# Patient Record
Sex: FEMALE | Race: BLACK OR AFRICAN AMERICAN | NOT HISPANIC OR LATINO
[De-identification: names, ages, dates, MRNs, and addresses within clinical notes are randomized per-mention and may not be internally consistent; named-entity substitution may affect disease eponyms.]

---

## 2023-07-17 PROBLEM — Z00.00 ENCOUNTER FOR PREVENTIVE HEALTH EXAMINATION: Status: ACTIVE | Noted: 2023-07-17

## 2023-08-02 ENCOUNTER — APPOINTMENT (OUTPATIENT)
Dept: OBGYN | Facility: CLINIC | Age: 28
End: 2023-08-02
Payer: MEDICAID

## 2023-08-02 ENCOUNTER — ASOB RESULT (OUTPATIENT)
Age: 28
End: 2023-08-02

## 2023-08-02 VITALS
HEART RATE: 96 BPM | BODY MASS INDEX: 36.48 KG/M2 | HEIGHT: 66 IN | WEIGHT: 227 LBS | DIASTOLIC BLOOD PRESSURE: 78 MMHG | SYSTOLIC BLOOD PRESSURE: 117 MMHG

## 2023-08-02 DIAGNOSIS — Z34.91 ENCOUNTER FOR SUPERVISION OF NORMAL PREGNANCY, UNSPECIFIED, FIRST TRIMESTER: ICD-10-CM

## 2023-08-02 PROCEDURE — 99203 OFFICE O/P NEW LOW 30 MIN: CPT

## 2023-08-02 PROCEDURE — 76817 TRANSVAGINAL US OBSTETRIC: CPT

## 2023-08-02 NOTE — PLAN
[FreeTextEntry1] : 27 year yo  @ 8 weeks 4 d by LMP c/w early ultrasound.   Routine new OB labs and counseling. Welcome letter given.   Discussed screening vs. diagnostic/invasive testing for chromosomal abnormalities. Declines amnio/[facility]. Will schedule NT, will decide re: NIPS by next visit.   RTO 4 wks.  Aldo Leong

## 2023-08-02 NOTE — HISTORY OF PRESENT ILLNESS
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 Reports significant nausea/vomiting, though still able to keep food down. +breast tenderness. No abdominal cramping. No vaginal bleeding. +constipation. LMP 6/3/23

## 2023-08-03 LAB
ABO + RH PNL BLD: NORMAL
ALBUMIN SERPL ELPH-MCNC: 4.4 G/DL
ALP BLD-CCNC: 73 U/L
ALT SERPL-CCNC: 9 U/L
ANION GAP SERPL CALC-SCNC: 20 MMOL/L
AST SERPL-CCNC: 13 U/L
B19V IGG SER QL IA: 4.54 INDEX
B19V IGG+IGM SER-IMP: NORMAL
B19V IGG+IGM SER-IMP: POSITIVE
B19V IGM FLD-ACNC: 0.17 INDEX
B19V IGM SER-ACNC: NEGATIVE
BILIRUB SERPL-MCNC: 0.2 MG/DL
BLD GP AB SCN SERPL QL: NORMAL
BUN SERPL-MCNC: 9 MG/DL
C TRACH RRNA SPEC QL NAA+PROBE: NOT DETECTED
CALCIUM SERPL-MCNC: 10 MG/DL
CHLORIDE SERPL-SCNC: 101 MMOL/L
CMV IGG SERPL QL: >10 U/ML
CMV IGG SERPL-IMP: POSITIVE
CMV IGM SERPL QL: <8 AU/ML
CMV IGM SERPL QL: NEGATIVE
CO2 SERPL-SCNC: 19 MMOL/L
CREAT SERPL-MCNC: 0.85 MG/DL
EGFR: 96 ML/MIN/1.73M2
ESTIMATED AVERAGE GLUCOSE: 97 MG/DL
GLUCOSE SERPL-MCNC: 47 MG/DL
HBA1C MFR BLD HPLC: 5 %
HBV SURFACE AG SER QL: NONREACTIVE
HCV AB SER QL: NONREACTIVE
HCV S/CO RATIO: 0.11 S/CO
HIV1+2 AB SPEC QL IA.RAPID: NONREACTIVE
MEV IGG FLD QL IA: 150 AU/ML
MEV IGG+IGM SER-IMP: POSITIVE
N GONORRHOEA RRNA SPEC QL NAA+PROBE: NOT DETECTED
POTASSIUM SERPL-SCNC: 4.3 MMOL/L
PROT SERPL-MCNC: 7.5 G/DL
RUBV IGG FLD-ACNC: 1.4 INDEX
RUBV IGG SER-IMP: POSITIVE
SODIUM SERPL-SCNC: 140 MMOL/L
SOURCE AMPLIFICATION: NORMAL
T GONDII AB SER-IMP: NEGATIVE
T GONDII AB SER-IMP: NEGATIVE
T GONDII IGG SER QL: <3 IU/ML
T GONDII IGM SER QL: <3 AU/ML
T PALLIDUM AB SER QL IA: NEGATIVE
TSH SERPL-ACNC: 1.15 UIU/ML
VZV AB TITR SER: POSITIVE
VZV IGG SER IF-ACNC: 563.7 INDEX

## 2023-08-04 LAB
BACTERIA UR CULT: NORMAL
LEAD BLD-MCNC: 1.1 UG/DL

## 2023-08-13 ENCOUNTER — TRANSCRIPTION ENCOUNTER (OUTPATIENT)
Age: 28
End: 2023-08-13

## 2023-09-05 ENCOUNTER — NON-APPOINTMENT (OUTPATIENT)
Age: 28
End: 2023-09-05

## 2023-09-06 ENCOUNTER — NON-APPOINTMENT (OUTPATIENT)
Age: 28
End: 2023-09-06

## 2023-09-06 ENCOUNTER — ASOB RESULT (OUTPATIENT)
Age: 28
End: 2023-09-06

## 2023-09-06 ENCOUNTER — APPOINTMENT (OUTPATIENT)
Dept: ANTEPARTUM | Facility: CLINIC | Age: 28
End: 2023-09-06
Payer: MEDICAID

## 2023-09-06 ENCOUNTER — APPOINTMENT (OUTPATIENT)
Dept: OBGYN | Facility: CLINIC | Age: 28
End: 2023-09-06
Payer: MEDICAID

## 2023-09-06 VITALS
HEIGHT: 66 IN | WEIGHT: 233 LBS | BODY MASS INDEX: 37.45 KG/M2 | SYSTOLIC BLOOD PRESSURE: 114 MMHG | DIASTOLIC BLOOD PRESSURE: 75 MMHG | HEART RATE: 93 BPM

## 2023-09-06 PROCEDURE — 76813 OB US NUCHAL MEAS 1 GEST: CPT

## 2023-09-06 PROCEDURE — 76801 OB US < 14 WKS SINGLE FETUS: CPT

## 2023-09-06 PROCEDURE — 99212 OFFICE O/P EST SF 10 MIN: CPT

## 2023-09-13 RX ORDER — PRENATAL WITH FERROUS FUM AND FOLIC ACID 3080; 920; 120; 400; 22; 1.84; 3; 20; 10; 1; 12; 200; 27; 25; 2 [IU]/1; [IU]/1; MG/1; [IU]/1; MG/1; MG/1; MG/1; MG/1; MG/1; MG/1; UG/1; MG/1; MG/1; MG/1; MG/1
27-1 TABLET ORAL DAILY
Qty: 30 | Refills: 6 | Status: ACTIVE | COMMUNITY
Start: 2023-08-02 | End: 1900-01-01

## 2023-10-05 ENCOUNTER — LABORATORY RESULT (OUTPATIENT)
Age: 28
End: 2023-10-05

## 2023-10-05 ENCOUNTER — APPOINTMENT (OUTPATIENT)
Dept: OBGYN | Facility: CLINIC | Age: 28
End: 2023-10-05
Payer: MEDICAID

## 2023-10-05 VITALS
WEIGHT: 235 LBS | HEART RATE: 94 BPM | BODY MASS INDEX: 37.77 KG/M2 | SYSTOLIC BLOOD PRESSURE: 120 MMHG | DIASTOLIC BLOOD PRESSURE: 77 MMHG | HEIGHT: 66 IN

## 2023-10-05 DIAGNOSIS — Z34.92 ENCOUNTER FOR SUPERVISION OF NORMAL PREGNANCY, UNSPECIFIED, SECOND TRIMESTER: ICD-10-CM

## 2023-10-05 PROCEDURE — 0502F SUBSEQUENT PRENATAL CARE: CPT

## 2023-10-09 LAB
AFP MOM: 0.93
AFP VALUE: 30.1 NG/ML
ALPHA FETOPROTEIN SERUM COMMENT: NORMAL
ALPHA FETOPROTEIN SERUM INTERPRETATION: NORMAL
ALPHA FETOPROTEIN SERUM RESULTS: NORMAL
ALPHA FETOPROTEIN SERUM TEST RESULTS: NORMAL
GESTATIONAL AGE BASED ON: NORMAL
GESTATIONAL AGE ON COLLECTION DATE: 17.7 WEEKS
INSULIN DEP DIABETES: NO
MATERNAL AGE AT EDD AFP: 29.3 YR
MULTIPLE GESTATION: NO
OSBR RISK 1 IN: NORMAL
RACE: NORMAL
WEIGHT AFP: 235 LBS

## 2023-10-23 ENCOUNTER — APPOINTMENT (OUTPATIENT)
Dept: ANTEPARTUM | Facility: CLINIC | Age: 28
End: 2023-10-23
Payer: MEDICAID

## 2023-10-23 ENCOUNTER — ASOB RESULT (OUTPATIENT)
Age: 28
End: 2023-10-23

## 2023-10-23 PROCEDURE — 76811 OB US DETAILED SNGL FETUS: CPT | Mod: 59

## 2023-10-23 PROCEDURE — 76817 TRANSVAGINAL US OBSTETRIC: CPT

## 2023-11-01 ENCOUNTER — APPOINTMENT (OUTPATIENT)
Dept: OBGYN | Facility: CLINIC | Age: 28
End: 2023-11-01
Payer: MEDICAID

## 2023-11-01 VITALS
BODY MASS INDEX: 38.25 KG/M2 | HEART RATE: 118 BPM | DIASTOLIC BLOOD PRESSURE: 78 MMHG | WEIGHT: 238 LBS | SYSTOLIC BLOOD PRESSURE: 121 MMHG | HEIGHT: 66 IN

## 2023-11-01 PROCEDURE — 99212 OFFICE O/P EST SF 10 MIN: CPT

## 2023-11-29 ENCOUNTER — APPOINTMENT (OUTPATIENT)
Dept: OBGYN | Facility: CLINIC | Age: 28
End: 2023-11-29
Payer: MEDICAID

## 2023-11-29 VITALS
HEIGHT: 66 IN | SYSTOLIC BLOOD PRESSURE: 128 MMHG | HEART RATE: 111 BPM | BODY MASS INDEX: 38.25 KG/M2 | DIASTOLIC BLOOD PRESSURE: 80 MMHG | WEIGHT: 238 LBS

## 2023-11-29 PROCEDURE — 0502F SUBSEQUENT PRENATAL CARE: CPT

## 2023-12-02 LAB
GLUCOSE 1H P 50 G GLC PO SERPL-MCNC: 110 MG/DL
HCT VFR BLD CALC: 38.1 %
HGB BLD-MCNC: 12.2 G/DL
MCHC RBC-ENTMCNC: 29.8 PG
MCHC RBC-ENTMCNC: 32 GM/DL
MCV RBC AUTO: 93.2 FL
PLATELET # BLD AUTO: 312 K/UL
RBC # BLD: 4.09 M/UL
RBC # FLD: 14 %
T PALLIDUM AB SER QL IA: NEGATIVE
WBC # FLD AUTO: 8.51 K/UL

## 2023-12-18 ENCOUNTER — OUTPATIENT (OUTPATIENT)
Dept: INPATIENT UNIT | Facility: HOSPITAL | Age: 28
LOS: 1 days | Discharge: HOME | End: 2023-12-18
Payer: MEDICAID

## 2023-12-18 ENCOUNTER — EMERGENCY (EMERGENCY)
Facility: HOSPITAL | Age: 28
LOS: 1 days | Discharge: HOME | End: 2023-12-18
Attending: STUDENT IN AN ORGANIZED HEALTH CARE EDUCATION/TRAINING PROGRAM | Admitting: STUDENT IN AN ORGANIZED HEALTH CARE EDUCATION/TRAINING PROGRAM
Payer: MEDICAID

## 2023-12-18 VITALS
HEART RATE: 101 BPM | SYSTOLIC BLOOD PRESSURE: 113 MMHG | DIASTOLIC BLOOD PRESSURE: 62 MMHG | TEMPERATURE: 99 F | RESPIRATION RATE: 18 BRPM

## 2023-12-18 VITALS — SYSTOLIC BLOOD PRESSURE: 113 MMHG | DIASTOLIC BLOOD PRESSURE: 57 MMHG | HEART RATE: 97 BPM

## 2023-12-18 VITALS
RESPIRATION RATE: 16 BRPM | TEMPERATURE: 99 F | DIASTOLIC BLOOD PRESSURE: 65 MMHG | SYSTOLIC BLOOD PRESSURE: 113 MMHG | HEART RATE: 110 BPM | OXYGEN SATURATION: 100 %

## 2023-12-18 DIAGNOSIS — Z98.890 OTHER SPECIFIED POSTPROCEDURAL STATES: Chronic | ICD-10-CM

## 2023-12-18 DIAGNOSIS — O26.899 OTHER SPECIFIED PREGNANCY RELATED CONDITIONS, UNSPECIFIED TRIMESTER: ICD-10-CM

## 2023-12-18 LAB
APPEARANCE UR: ABNORMAL
APPEARANCE UR: ABNORMAL
BACTERIA # UR AUTO: ABNORMAL /HPF
BACTERIA # UR AUTO: ABNORMAL /HPF
BILIRUB UR-MCNC: NEGATIVE — SIGNIFICANT CHANGE UP
BILIRUB UR-MCNC: NEGATIVE — SIGNIFICANT CHANGE UP
CAST: 0 /LPF — SIGNIFICANT CHANGE UP (ref 0–4)
CAST: 0 /LPF — SIGNIFICANT CHANGE UP (ref 0–4)
COLOR SPEC: YELLOW — SIGNIFICANT CHANGE UP
COLOR SPEC: YELLOW — SIGNIFICANT CHANGE UP
DIFF PNL FLD: ABNORMAL
DIFF PNL FLD: ABNORMAL
GLUCOSE UR QL: NEGATIVE MG/DL — SIGNIFICANT CHANGE UP
GLUCOSE UR QL: NEGATIVE MG/DL — SIGNIFICANT CHANGE UP
KETONES UR-MCNC: 40 MG/DL
KETONES UR-MCNC: 40 MG/DL
LEUKOCYTE ESTERASE UR-ACNC: ABNORMAL
LEUKOCYTE ESTERASE UR-ACNC: ABNORMAL
NITRITE UR-MCNC: NEGATIVE — SIGNIFICANT CHANGE UP
NITRITE UR-MCNC: NEGATIVE — SIGNIFICANT CHANGE UP
PH UR: 6.5 — SIGNIFICANT CHANGE UP (ref 5–8)
PH UR: 6.5 — SIGNIFICANT CHANGE UP (ref 5–8)
PROT UR-MCNC: SIGNIFICANT CHANGE UP MG/DL
PROT UR-MCNC: SIGNIFICANT CHANGE UP MG/DL
RBC CASTS # UR COMP ASSIST: 2 /HPF — SIGNIFICANT CHANGE UP (ref 0–4)
RBC CASTS # UR COMP ASSIST: 2 /HPF — SIGNIFICANT CHANGE UP (ref 0–4)
REVIEW: SIGNIFICANT CHANGE UP
REVIEW: SIGNIFICANT CHANGE UP
SP GR SPEC: 1.02 — SIGNIFICANT CHANGE UP (ref 1–1.03)
SP GR SPEC: 1.02 — SIGNIFICANT CHANGE UP (ref 1–1.03)
SQUAMOUS # UR AUTO: 18 /HPF — HIGH (ref 0–5)
SQUAMOUS # UR AUTO: 18 /HPF — HIGH (ref 0–5)
UROBILINOGEN FLD QL: 0.2 MG/DL — SIGNIFICANT CHANGE UP (ref 0.2–1)
UROBILINOGEN FLD QL: 0.2 MG/DL — SIGNIFICANT CHANGE UP (ref 0.2–1)
WBC UR QL: 9 /HPF — HIGH (ref 0–5)
WBC UR QL: 9 /HPF — HIGH (ref 0–5)

## 2023-12-18 PROCEDURE — 59025 FETAL NON-STRESS TEST: CPT | Mod: 26

## 2023-12-18 PROCEDURE — 99221 1ST HOSP IP/OBS SF/LOW 40: CPT | Mod: 25

## 2023-12-18 PROCEDURE — 76857 US EXAM PELVIC LIMITED: CPT | Mod: 26

## 2023-12-18 PROCEDURE — 99285 EMERGENCY DEPT VISIT HI MDM: CPT

## 2023-12-18 NOTE — OB RN TRIAGE NOTE - FALL HARM RISK - UNIVERSAL INTERVENTIONS
Bed in lowest position, wheels locked, appropriate side rails in place/Call bell, personal items and telephone in reach/Instruct patient to call for assistance before getting out of bed or chair/Non-slip footwear when patient is out of bed/[place] to call system/Physically safe environment - no spills, clutter or unnecessary equipment/Purposeful Proactive Rounding/Room/bathroom lighting operational, light cord in reach Bed in lowest position, wheels locked, appropriate side rails in place/Call bell, personal items and telephone in reach/Instruct patient to call for assistance before getting out of bed or chair/Non-slip footwear when patient is out of bed/[place] to call system/Physically safe environment - no spills, clutter or unnecessary equipment/Purposeful Proactive Rounding/Room/bathroom lighting operational, light cord in reach

## 2023-12-18 NOTE — ED PROVIDER NOTE - PHYSICAL EXAMINATION
GEN: no acute respiratory distress. nontoxic, speaking comfortably in full sentences, ambulating with steady gait.  HEENT: NCAT. face symmetrical. PERRL 4mm, EOMI, MMM, oropharynx wnl.  Neck: no JVD, trachea midline, no lymphadenopathy, FROM  CV: RRR. +S1S2, no murmur. 2+ pulses in 4 extremities, cap refill <2 sec  Chest: CTA B/l. no wheezing, rales, rhonchi. no retractions. good air movement. no tenderness. no rash or ecchymosis  ABD: +BS, soft, non distended, non tender. No guarding/rebound. No lesions, ecchymosis, surgical scar  : no cva or suprapubic tenderness. RN karey barclay. No external lesions.  Yellowish-whitish discharge inside vaginal vault, no blood cervical os closed closed.  MSK: No clubbing, cyanosis, edema. FROM of all extremities. no tenderness to palpation. No midline or paraspinal tenderness.   Neuro: AAOX3.   SKIN: No erythema, lesions or rash

## 2023-12-18 NOTE — OB PROVIDER TRIAGE NOTE - NS_TRIAGEMEDICALSCREENINGPERFORMEDBY_OBGYN_ALL_OB_FT
----- Message from Jong Fernandez MD sent at 3/15/2022 11:14 AM EDT -----  Regarding: gi follow up  Please call and let patient know that [facility] will be calling her to set up an appointment for next week. They will review her history and symptoms and decide at that point if a repeat colonoscopy which would be due next January anyway should be done now versus that anorectal manometry as we discussed yesterday. Then that could be scheduled.   She should expect to hear from them to set up that appointment
Called patient and explained that GI office will be calling her to set up appt sometime next week.
DOMITILA AYALA

## 2023-12-18 NOTE — OB PROVIDER TRIAGE NOTE - NS_CXLENGTH_OBGYN_ALL_OB_FT
Can C/W Cilostazol 50 BID    Hold Eliquis; iso recent GI banding  Discuss with outpatient GI Dr. Ian Bearden ([phone]) when to restart AC as discharge instructions state patient should contact him prior to resuming. Patient has been off eliquis for ~1 week
4.13-4.32

## 2023-12-18 NOTE — OB PROVIDER TRIAGE NOTE - PLAN OF CARE
- Please follow up with your obstetrician at your next scheduled appointment.   - Please return for decreased/no fetal movement, vaginal bleeding similar to that of a period, leaking/gush of fluid, regular contractions occurring 4-5 minutes for one hour or requiring pain medication   - Patient educated of plan and demonstrate understanding. All questions answered. Discharge instructions provided and signed.

## 2023-12-18 NOTE — ED PROVIDER NOTE - NS ED ROS FT
Constitutional: No fever. no chills.   Eyes: No visual changes, eye pain or redness  HEENT: No throat pain, hearing changes, ear pain, nasal pain. No nose bleeding.  CV: No chest pain, no palpitations  Resp: No shortness of breath, no cough  GI: + abdominal pain. No nausea. no  vomiting. No diarrhea. No constipation.   : + dysuria, hematuria. no urinary frequency, no urinary urgency. + vaginal discharge  MSK: No musculoskeletal pain  Skin: No rash, lesions, no bruises  Neuro: No headache. No numbness or tingling. No weakness. No dizziness  Allergy/Immunology: no allergy to medicine or food.

## 2023-12-18 NOTE — OB PROVIDER TRIAGE NOTE - NSHPPHYSICALEXAM_GEN_ALL_CORE
T(C): 37.1 (12-18-23 @ 20:17), Max: 37.1 (12-18-23 @ 16:57)  HR: 101 (12-18-23 @ 20:23) (101 - 110)  BP: 113/62 (12-18-23 @ 20:23) (113/62 - 113/65)  RR: 18 (12-18-23 @ 20:17) (16 - 18)  SpO2: 100% (12-18-23 @ 16:57) (100% - 100%)  – PE:   CV: RRR  Pulm: breathing comfortably on RA  Abd: soft, gravid, nontender  Extr: moving all extremities with ease  TAUS: images saved in ASOB, vertex position, __ placenta, M mode:  FHR, MAURIZIO:  NST: baseline ___ FHR, ___ variability, + accels, - decels, ___reactive T(C): 37.1 (12-18-23 @ 20:17), Max: 37.1 (12-18-23 @ 16:57)  HR: 101 (12-18-23 @ 20:23) (101 - 110)  BP: 113/62 (12-18-23 @ 20:23) (113/62 - 113/65)  RR: 18 (12-18-23 @ 20:17) (16 - 18)  SpO2: 100% (12-18-23 @ 16:57) (100% - 100%)  – PE:   CV: RRR  Pulm: breathing comfortably on RA  Abd: soft, gravid, nontender  Extr: moving all extremities with ease  TAUS: images saved in ASOB, vertex position, anterior placenta, MAURIZIO: 11.58, BPP 8/8  NST: baseline 140 FHR, moderate variability, + accels, - decels, reactive  SSE: cervix appears closed, + cervical friability, no bleeding/LOF

## 2023-12-18 NOTE — ED ADULT NURSE NOTE - OBJECTIVE STATEMENT
Pt received to intake 4 A&OX4 ambulatory  28 weeks pregnant c/o lower abdominal cramping, 1 episode of vaginal bleeding today. MD Rodriguez at bedside for pelvic. No active bleeding. Pt to be transported to L&D. OBMARI Tyler EDD 3/9

## 2023-12-18 NOTE — ED PROVIDER NOTE - OBJECTIVE STATEMENT
19-year-old female no significant past medical history approximately 28 weeks pregnant, patient of Dr. Loving presents for vaginal spotting and intermittent lower abdominal discomfort.  Patient reports having intercourse last night but symptoms prior.  Notes mild vaginal discharge since intercourse.  Sexually active with long-term partner only.  Also reports some dysuria today.  Denies nausea vomiting lower back pain extremity pain fevers chills chest pain shortness of breath. 28-year-old female no significant past medical history approximately 28 weeks pregnant, patient of Dr. Loving presents for vaginal spotting and intermittent lower abdominal discomfort.  Patient reports having intercourse last night but symptoms prior.  Notes mild vaginal discharge since intercourse.  Sexually active with long-term partner only.  Also reports some dysuria today.  Denies nausea vomiting lower back pain extremity pain fevers chills chest pain shortness of breath.

## 2023-12-18 NOTE — ED ADULT TRIAGE NOTE - CHIEF COMPLAINT QUOTE
Pt is 28 weeks pregnant c/o vag bleed, seen on tissue after using BR. mild pelvic area pressure since today

## 2023-12-18 NOTE — OB PROVIDER TRIAGE NOTE - NSHPLABSRESULTS_GEN_ALL_CORE
Urinalysis Basic - ( 18 Dec 2023 21:06 )    Color: Yellow / Appearance: Cloudy / S.019 / pH: x  Gluc: x / Ketone: 40 mg/dL  / Bili: Negative / Urobili: 0.2 mg/dL   Blood: x / Protein: Trace mg/dL / Nitrite: Negative   Leuk Esterase: Large / RBC: x / WBC x   Sq Epi: x / Non Sq Epi: x / Bacteria: x

## 2023-12-18 NOTE — OB PROVIDER TRIAGE NOTE - HISTORY OF PRESENT ILLNESS
28y , EGA@ 28.2 weeks, presents for vaginal spotting s/p intercourse last night. Patient denies contractions, reports  + fetal movements,  denies leaking of fluid. Pt denies any other concerns.  Antepartum course is significant for:  - PNC with Dr. Leong                       28y , EGA@ 28.2 weeks, presents for vaginal spotting s/p intercourse last night. Pt reports two episodes of brown mucus when wiping after urinating. Pt reported right lower abdominal pressure after intercourse last night, but nothing since. Pt reports dysuria last night but not since. Patient denies contractions, reports  + fetal movements, denies leaking of fluid. Pt denies any other concerns.  Antepartum course is significant for:  - PNC with Dr. Leong                       28y , EGA@ 28.2 weeks, presents for vaginal spotting s/p intercourse last night. Pt reports two episodes of brown mucus when wiping after urinating. Pt reported right lower abdominal pressure after intercourse last night, but nothing since. Pt reports dysuria last night but not since. Patient denies contractions, reports  + fetal movements, denies leaking of fluid. Pt denies any other concerns.  Antepartum course is significant for:  - PNC with Dr. eLong                       28y , EGA@ 28.2 weeks, presents for vaginal spotting s/p intercourse last night. Pt reports two episodes of brown mucus when wiping after urinating. Pt reported right lower abdominal pressure after intercourse last night, but nothing since. Pt reports dysuria last night but not since. Patient denies contractions, reports  + fetal movements, denies leaking of fluid. Pt denies any other concerns. Blood type O+.  Antepartum course is significant for:  - PNC with Dr. Leong

## 2023-12-18 NOTE — ED PROVIDER NOTE - CLINICAL SUMMARY MEDICAL DECISION MAKING FREE TEXT BOX
Spoke with OB/GYN resident recommends transfer to L&D.   note only the GC chlamydia swab sent off other labs canceled as patient going to L&D,And labs not drawn yet.Fetal heart rate 136. 28-year-old female with vaginal spotting and intermittent lower abdominal pain.  Did have intercourse yesterday.  Abdomen soft nontender small vaginal discharge swab sent.   Spoke with OB/GYN resident recommends transfer to L&D.   note only the GC chlamydia swab sent off. other labs canceled as patient going to L&D,And labs not drawn yet.  Fetal heart rate 136.

## 2023-12-18 NOTE — OB PROVIDER TRIAGE NOTE - NSOBPROVIDERNOTE_OBGYN_ALL_OB_FT
28y  @28.2w female with vaginal spotting, ruled out for  labor and vaginal bleeding, discharged to home.    - Patient to be discharged home with follow up and return precautions  - Urine culture sent  - Please follow up with your obstetrician at your next scheduled appointment.   - Please return for decreased/no fetal movement, vaginal bleeding similar to that of a period, leaking/gush of fluid, regular contractions occurring 4-5 minutes for one hour or requiring pain medication   - Patient educated of plan and demonstrate understanding. All questions answered. Discharge instructions provided and signed.   - Discharged at 2150  - Discussed with Dr. Lassiter (Dr. Cain in OR for emergent case and made aware)

## 2023-12-20 ENCOUNTER — APPOINTMENT (OUTPATIENT)
Dept: ANTEPARTUM | Facility: CLINIC | Age: 28
End: 2023-12-20
Payer: MEDICAID

## 2023-12-20 ENCOUNTER — ASOB RESULT (OUTPATIENT)
Age: 28
End: 2023-12-20

## 2023-12-20 DIAGNOSIS — O26.893 OTHER SPECIFIED PREGNANCY RELATED CONDITIONS, THIRD TRIMESTER: ICD-10-CM

## 2023-12-20 DIAGNOSIS — O26.853 SPOTTING COMPLICATING PREGNANCY, THIRD TRIMESTER: ICD-10-CM

## 2023-12-20 DIAGNOSIS — Z3A.28 28 WEEKS GESTATION OF PREGNANCY: ICD-10-CM

## 2023-12-20 DIAGNOSIS — R30.0 DYSURIA: ICD-10-CM

## 2023-12-20 LAB
C TRACH RRNA SPEC QL NAA+PROBE: SIGNIFICANT CHANGE UP
C TRACH RRNA SPEC QL NAA+PROBE: SIGNIFICANT CHANGE UP
CULTURE RESULTS: SIGNIFICANT CHANGE UP
CULTURE RESULTS: SIGNIFICANT CHANGE UP
N GONORRHOEA RRNA SPEC QL NAA+PROBE: SIGNIFICANT CHANGE UP
N GONORRHOEA RRNA SPEC QL NAA+PROBE: SIGNIFICANT CHANGE UP
SPECIMEN SOURCE: SIGNIFICANT CHANGE UP
SPECIMEN SOURCE: SIGNIFICANT CHANGE UP

## 2023-12-20 PROCEDURE — 76816 OB US FOLLOW-UP PER FETUS: CPT

## 2023-12-20 PROCEDURE — 76819 FETAL BIOPHYS PROFIL W/O NST: CPT | Mod: 59

## 2023-12-27 ENCOUNTER — APPOINTMENT (OUTPATIENT)
Dept: OBGYN | Facility: CLINIC | Age: 28
End: 2023-12-27
Payer: MEDICAID

## 2023-12-27 VITALS
WEIGHT: 244 LBS | HEIGHT: 66 IN | BODY MASS INDEX: 39.21 KG/M2 | HEART RATE: 102 BPM | DIASTOLIC BLOOD PRESSURE: 77 MMHG | SYSTOLIC BLOOD PRESSURE: 116 MMHG

## 2023-12-27 PROBLEM — Z78.9 OTHER SPECIFIED HEALTH STATUS: Chronic | Status: ACTIVE | Noted: 2023-12-18

## 2023-12-27 PROCEDURE — 0502F SUBSEQUENT PRENATAL CARE: CPT

## 2024-01-17 ENCOUNTER — ASOB RESULT (OUTPATIENT)
Age: 29
End: 2024-01-17

## 2024-01-17 ENCOUNTER — APPOINTMENT (OUTPATIENT)
Dept: ANTEPARTUM | Facility: CLINIC | Age: 29
End: 2024-01-17
Payer: MEDICAID

## 2024-01-17 ENCOUNTER — APPOINTMENT (OUTPATIENT)
Dept: OBGYN | Facility: CLINIC | Age: 29
End: 2024-01-17
Payer: MEDICAID

## 2024-01-17 VITALS
HEART RATE: 91 BPM | WEIGHT: 243 LBS | DIASTOLIC BLOOD PRESSURE: 78 MMHG | BODY MASS INDEX: 39.05 KG/M2 | SYSTOLIC BLOOD PRESSURE: 117 MMHG | HEIGHT: 66 IN

## 2024-01-17 DIAGNOSIS — Z23 ENCOUNTER FOR IMMUNIZATION: ICD-10-CM

## 2024-01-17 PROCEDURE — 0502F SUBSEQUENT PRENATAL CARE: CPT

## 2024-01-17 PROCEDURE — 90471 IMMUNIZATION ADMIN: CPT

## 2024-01-17 PROCEDURE — 76819 FETAL BIOPHYS PROFIL W/O NST: CPT | Mod: 59

## 2024-01-17 PROCEDURE — 76816 OB US FOLLOW-UP PER FETUS: CPT

## 2024-01-17 PROCEDURE — 90715 TDAP VACCINE 7 YRS/> IM: CPT

## 2024-01-31 ENCOUNTER — NON-APPOINTMENT (OUTPATIENT)
Age: 29
End: 2024-01-31

## 2024-01-31 ENCOUNTER — APPOINTMENT (OUTPATIENT)
Dept: OBGYN | Facility: CLINIC | Age: 29
End: 2024-01-31
Payer: MEDICAID

## 2024-01-31 VITALS
SYSTOLIC BLOOD PRESSURE: 127 MMHG | BODY MASS INDEX: 39.86 KG/M2 | DIASTOLIC BLOOD PRESSURE: 72 MMHG | HEART RATE: 106 BPM | HEIGHT: 66 IN | WEIGHT: 248 LBS

## 2024-01-31 PROCEDURE — 0502F SUBSEQUENT PRENATAL CARE: CPT

## 2024-02-15 ENCOUNTER — APPOINTMENT (OUTPATIENT)
Dept: ANTEPARTUM | Facility: CLINIC | Age: 29
End: 2024-02-15
Payer: MEDICAID

## 2024-02-15 ENCOUNTER — ASOB RESULT (OUTPATIENT)
Age: 29
End: 2024-02-15

## 2024-02-15 ENCOUNTER — APPOINTMENT (OUTPATIENT)
Dept: OBGYN | Facility: CLINIC | Age: 29
End: 2024-02-15
Payer: MEDICAID

## 2024-02-15 VITALS
HEIGHT: 66 IN | WEIGHT: 253 LBS | BODY MASS INDEX: 40.66 KG/M2 | DIASTOLIC BLOOD PRESSURE: 79 MMHG | HEART RATE: 120 BPM | SYSTOLIC BLOOD PRESSURE: 126 MMHG

## 2024-02-15 DIAGNOSIS — K59.00 CONSTIPATION, UNSPECIFIED: ICD-10-CM

## 2024-02-15 DIAGNOSIS — N76.0 ACUTE VAGINITIS: ICD-10-CM

## 2024-02-15 DIAGNOSIS — B96.89 ACUTE VAGINITIS: ICD-10-CM

## 2024-02-15 PROCEDURE — 36415 COLL VENOUS BLD VENIPUNCTURE: CPT

## 2024-02-15 PROCEDURE — 76816 OB US FOLLOW-UP PER FETUS: CPT

## 2024-02-15 PROCEDURE — 0502F SUBSEQUENT PRENATAL CARE: CPT

## 2024-02-15 PROCEDURE — 76819 FETAL BIOPHYS PROFIL W/O NST: CPT | Mod: 59

## 2024-02-15 RX ORDER — DOCUSATE SODIUM 100 MG/1
100 CAPSULE ORAL TWICE DAILY
Qty: 60 | Refills: 0 | Status: ACTIVE | COMMUNITY
Start: 2024-02-15 | End: 1900-01-01

## 2024-02-16 LAB
BASOPHILS # BLD AUTO: 0.03 K/UL
BASOPHILS NFR BLD AUTO: 0.3 %
EOSINOPHIL # BLD AUTO: 0.04 K/UL
EOSINOPHIL NFR BLD AUTO: 0.4 %
HCT VFR BLD CALC: 37 %
HGB BLD-MCNC: 12.1 G/DL
HIV1+2 AB SPEC QL IA.RAPID: NONREACTIVE
IMM GRANULOCYTES NFR BLD AUTO: 1.8 %
LYMPHOCYTES # BLD AUTO: 2.03 K/UL
LYMPHOCYTES NFR BLD AUTO: 21.5 %
MAN DIFF?: NORMAL
MCHC RBC-ENTMCNC: 29.4 PG
MCHC RBC-ENTMCNC: 32.7 GM/DL
MCV RBC AUTO: 90 FL
MONOCYTES # BLD AUTO: 0.7 K/UL
MONOCYTES NFR BLD AUTO: 7.4 %
NEUTROPHILS # BLD AUTO: 6.45 K/UL
NEUTROPHILS NFR BLD AUTO: 68.6 %
PLATELET # BLD AUTO: 304 K/UL
RBC # BLD: 4.11 M/UL
RBC # FLD: 14.7 %
WBC # FLD AUTO: 9.42 K/UL

## 2024-02-20 ENCOUNTER — NON-APPOINTMENT (OUTPATIENT)
Age: 29
End: 2024-02-20

## 2024-02-21 ENCOUNTER — APPOINTMENT (OUTPATIENT)
Dept: OBGYN | Facility: CLINIC | Age: 29
End: 2024-02-21
Payer: MEDICAID

## 2024-02-21 VITALS
HEART RATE: 102 BPM | SYSTOLIC BLOOD PRESSURE: 121 MMHG | WEIGHT: 257 LBS | DIASTOLIC BLOOD PRESSURE: 79 MMHG | HEIGHT: 66 IN | BODY MASS INDEX: 41.3 KG/M2

## 2024-02-21 PROCEDURE — 0502F SUBSEQUENT PRENATAL CARE: CPT

## 2024-02-23 DIAGNOSIS — B37.9 CANDIDIASIS, UNSPECIFIED: ICD-10-CM

## 2024-02-23 LAB
CANDIDA VAG CYTO: DETECTED
G VAGINALIS+PREV SP MTYP VAG QL MICRO: NOT DETECTED
GP B STREP DNA SPEC QL NAA+PROBE: NOT DETECTED
SOURCE GBS: NORMAL
T VAGINALIS VAG QL WET PREP: NOT DETECTED

## 2024-02-23 RX ORDER — TERCONAZOLE 8 MG/G
0.8 CREAM VAGINAL
Qty: 1 | Refills: 0 | Status: ACTIVE | COMMUNITY
Start: 2024-02-23 | End: 1900-01-01

## 2024-02-29 ENCOUNTER — APPOINTMENT (OUTPATIENT)
Dept: OBGYN | Facility: CLINIC | Age: 29
End: 2024-02-29
Payer: MEDICAID

## 2024-02-29 VITALS
BODY MASS INDEX: 41.14 KG/M2 | HEART RATE: 109 BPM | HEIGHT: 66 IN | WEIGHT: 256 LBS | SYSTOLIC BLOOD PRESSURE: 113 MMHG | DIASTOLIC BLOOD PRESSURE: 80 MMHG

## 2024-02-29 DIAGNOSIS — Z34.93 ENCOUNTER FOR SUPERVISION OF NORMAL PREGNANCY, UNSPECIFIED, THIRD TRIMESTER: ICD-10-CM

## 2024-02-29 PROCEDURE — 0502F SUBSEQUENT PRENATAL CARE: CPT

## 2024-03-06 ENCOUNTER — APPOINTMENT (OUTPATIENT)
Dept: OBGYN | Facility: CLINIC | Age: 29
End: 2024-03-06
Payer: MEDICAID

## 2024-03-06 VITALS — DIASTOLIC BLOOD PRESSURE: 82 MMHG | SYSTOLIC BLOOD PRESSURE: 126 MMHG

## 2024-03-06 VITALS
DIASTOLIC BLOOD PRESSURE: 82 MMHG | BODY MASS INDEX: 41.62 KG/M2 | HEART RATE: 103 BPM | HEIGHT: 66 IN | WEIGHT: 259 LBS | SYSTOLIC BLOOD PRESSURE: 135 MMHG

## 2024-03-06 PROCEDURE — 0502F SUBSEQUENT PRENATAL CARE: CPT

## 2024-03-12 ENCOUNTER — TRANSCRIPTION ENCOUNTER (OUTPATIENT)
Age: 29
End: 2024-03-12

## 2024-03-13 ENCOUNTER — APPOINTMENT (OUTPATIENT)
Dept: OBGYN | Facility: CLINIC | Age: 29
End: 2024-03-13

## 2024-03-13 ENCOUNTER — ASOB RESULT (OUTPATIENT)
Age: 29
End: 2024-03-13

## 2024-03-13 ENCOUNTER — APPOINTMENT (OUTPATIENT)
Dept: ANTEPARTUM | Facility: CLINIC | Age: 29
End: 2024-03-13
Payer: MEDICAID

## 2024-03-13 ENCOUNTER — APPOINTMENT (OUTPATIENT)
Dept: ANTEPARTUM | Facility: CLINIC | Age: 29
End: 2024-03-13

## 2024-03-13 ENCOUNTER — INPATIENT (INPATIENT)
Facility: HOSPITAL | Age: 29
LOS: 3 days | Discharge: HOME | End: 2024-03-17
Attending: STUDENT IN AN ORGANIZED HEALTH CARE EDUCATION/TRAINING PROGRAM | Admitting: STUDENT IN AN ORGANIZED HEALTH CARE EDUCATION/TRAINING PROGRAM
Payer: MEDICAID

## 2024-03-13 VITALS — HEART RATE: 93 BPM | SYSTOLIC BLOOD PRESSURE: 128 MMHG | DIASTOLIC BLOOD PRESSURE: 80 MMHG

## 2024-03-13 DIAGNOSIS — Z98.890 OTHER SPECIFIED POSTPROCEDURAL STATES: Chronic | ICD-10-CM

## 2024-03-13 DIAGNOSIS — O26.899 OTHER SPECIFIED PREGNANCY RELATED CONDITIONS, UNSPECIFIED TRIMESTER: ICD-10-CM

## 2024-03-13 LAB
BASOPHILS # BLD AUTO: 0.04 K/UL — SIGNIFICANT CHANGE UP (ref 0–0.2)
BASOPHILS NFR BLD AUTO: 0.4 % — SIGNIFICANT CHANGE UP (ref 0–2)
BLD GP AB SCN SERPL QL: NEGATIVE — SIGNIFICANT CHANGE UP
EOSINOPHIL # BLD AUTO: 0.06 K/UL — SIGNIFICANT CHANGE UP (ref 0–0.5)
EOSINOPHIL NFR BLD AUTO: 0.6 % — SIGNIFICANT CHANGE UP (ref 0–6)
HCT VFR BLD CALC: 38.8 % — SIGNIFICANT CHANGE UP (ref 34.5–45)
HGB BLD-MCNC: 12.7 G/DL — SIGNIFICANT CHANGE UP (ref 11.5–15.5)
IANC: 5.99 K/UL — SIGNIFICANT CHANGE UP (ref 1.8–7.4)
IMM GRANULOCYTES NFR BLD AUTO: 1.6 % — HIGH (ref 0–0.9)
LYMPHOCYTES # BLD AUTO: 2.4 K/UL — SIGNIFICANT CHANGE UP (ref 1–3.3)
LYMPHOCYTES # BLD AUTO: 25.1 % — SIGNIFICANT CHANGE UP (ref 13–44)
MCHC RBC-ENTMCNC: 29.7 PG — SIGNIFICANT CHANGE UP (ref 27–34)
MCHC RBC-ENTMCNC: 32.7 GM/DL — SIGNIFICANT CHANGE UP (ref 32–36)
MCV RBC AUTO: 90.9 FL — SIGNIFICANT CHANGE UP (ref 80–100)
MONOCYTES # BLD AUTO: 0.92 K/UL — HIGH (ref 0–0.9)
MONOCYTES NFR BLD AUTO: 9.6 % — SIGNIFICANT CHANGE UP (ref 2–14)
NEUTROPHILS # BLD AUTO: 5.99 K/UL — SIGNIFICANT CHANGE UP (ref 1.8–7.4)
NEUTROPHILS NFR BLD AUTO: 62.7 % — SIGNIFICANT CHANGE UP (ref 43–77)
NRBC # BLD: 0 /100 WBCS — SIGNIFICANT CHANGE UP (ref 0–0)
NRBC # FLD: 0 K/UL — SIGNIFICANT CHANGE UP (ref 0–0)
PLATELET # BLD AUTO: 282 K/UL — SIGNIFICANT CHANGE UP (ref 150–400)
RBC # BLD: 4.27 M/UL — SIGNIFICANT CHANGE UP (ref 3.8–5.2)
RBC # FLD: 14.6 % — HIGH (ref 10.3–14.5)
RH IG SCN BLD-IMP: POSITIVE — SIGNIFICANT CHANGE UP
RH IG SCN BLD-IMP: POSITIVE — SIGNIFICANT CHANGE UP
T PALLIDUM AB TITR SER: NEGATIVE — SIGNIFICANT CHANGE UP
WBC # BLD: 9.56 K/UL — SIGNIFICANT CHANGE UP (ref 3.8–10.5)
WBC # FLD AUTO: 9.56 K/UL — SIGNIFICANT CHANGE UP (ref 3.8–10.5)

## 2024-03-13 PROCEDURE — 76818 FETAL BIOPHYS PROFILE W/NST: CPT

## 2024-03-13 PROCEDURE — 76816 OB US FOLLOW-UP PER FETUS: CPT

## 2024-03-13 PROCEDURE — 72131 CT LUMBAR SPINE W/O DYE: CPT | Mod: 26

## 2024-03-13 PROCEDURE — 59510 CESAREAN DELIVERY: CPT | Mod: U9,GC

## 2024-03-13 PROCEDURE — 70450 CT HEAD/BRAIN W/O DYE: CPT | Mod: 26

## 2024-03-13 PROCEDURE — 72125 CT NECK SPINE W/O DYE: CPT | Mod: 26

## 2024-03-13 RX ORDER — KETOROLAC TROMETHAMINE 30 MG/ML
30 SYRINGE (ML) INJECTION EVERY 6 HOURS
Refills: 0 | Status: DISCONTINUED | OUTPATIENT
Start: 2024-03-13 | End: 2024-03-14

## 2024-03-13 RX ORDER — INFLUENZA VIRUS VACCINE 15; 15; 15; 15 UG/.5ML; UG/.5ML; UG/.5ML; UG/.5ML
0.5 SUSPENSION INTRAMUSCULAR ONCE
Refills: 0 | Status: DISCONTINUED | OUTPATIENT
Start: 2024-03-13 | End: 2024-03-17

## 2024-03-13 RX ORDER — OXYTOCIN 10 UNIT/ML
333.33 VIAL (ML) INJECTION
Qty: 20 | Refills: 0 | Status: DISCONTINUED | OUTPATIENT
Start: 2024-03-13 | End: 2024-03-17

## 2024-03-13 RX ORDER — OXYCODONE HYDROCHLORIDE 5 MG/1
5 TABLET ORAL
Refills: 0 | Status: COMPLETED | OUTPATIENT
Start: 2024-03-13 | End: 2024-03-20

## 2024-03-13 RX ORDER — SIMETHICONE 80 MG/1
80 TABLET, CHEWABLE ORAL EVERY 4 HOURS
Refills: 0 | Status: DISCONTINUED | OUTPATIENT
Start: 2024-03-13 | End: 2024-03-17

## 2024-03-13 RX ORDER — MAGNESIUM HYDROXIDE 400 MG/1
30 TABLET, CHEWABLE ORAL
Refills: 0 | Status: DISCONTINUED | OUTPATIENT
Start: 2024-03-13 | End: 2024-03-17

## 2024-03-13 RX ORDER — TETANUS TOXOID, REDUCED DIPHTHERIA TOXOID AND ACELLULAR PERTUSSIS VACCINE, ADSORBED 5; 2.5; 8; 8; 2.5 [IU]/.5ML; [IU]/.5ML; UG/.5ML; UG/.5ML; UG/.5ML
0.5 SUSPENSION INTRAMUSCULAR ONCE
Refills: 0 | Status: DISCONTINUED | OUTPATIENT
Start: 2024-03-13 | End: 2024-03-17

## 2024-03-13 RX ORDER — CITRIC ACID/SODIUM CITRATE 300-500 MG
15 SOLUTION, ORAL ORAL EVERY 6 HOURS
Refills: 0 | Status: DISCONTINUED | OUTPATIENT
Start: 2024-03-13 | End: 2024-03-14

## 2024-03-13 RX ORDER — SODIUM CHLORIDE 9 MG/ML
1000 INJECTION, SOLUTION INTRAVENOUS
Refills: 0 | Status: DISCONTINUED | OUTPATIENT
Start: 2024-03-13 | End: 2024-03-17

## 2024-03-13 RX ORDER — LANOLIN
1 OINTMENT (GRAM) TOPICAL EVERY 6 HOURS
Refills: 0 | Status: DISCONTINUED | OUTPATIENT
Start: 2024-03-13 | End: 2024-03-17

## 2024-03-13 RX ORDER — ONDANSETRON 8 MG/1
4 TABLET, FILM COATED ORAL ONCE
Refills: 0 | Status: COMPLETED | OUTPATIENT
Start: 2024-03-13 | End: 2024-03-13

## 2024-03-13 RX ORDER — HEPARIN SODIUM 5000 [USP'U]/ML
5000 INJECTION INTRAVENOUS; SUBCUTANEOUS EVERY 12 HOURS
Refills: 0 | Status: DISCONTINUED | OUTPATIENT
Start: 2024-03-13 | End: 2024-03-14

## 2024-03-13 RX ORDER — SODIUM CHLORIDE 9 MG/ML
1000 INJECTION, SOLUTION INTRAVENOUS
Refills: 0 | Status: DISCONTINUED | OUTPATIENT
Start: 2024-03-13 | End: 2024-03-14

## 2024-03-13 RX ORDER — ACETAMINOPHEN 500 MG
975 TABLET ORAL
Refills: 0 | Status: DISCONTINUED | OUTPATIENT
Start: 2024-03-13 | End: 2024-03-17

## 2024-03-13 RX ORDER — OXYTOCIN 10 UNIT/ML
333.33 VIAL (ML) INJECTION
Qty: 20 | Refills: 0 | Status: COMPLETED | OUTPATIENT
Start: 2024-03-13 | End: 2024-03-13

## 2024-03-13 RX ORDER — IBUPROFEN 200 MG
600 TABLET ORAL EVERY 6 HOURS
Refills: 0 | Status: COMPLETED | OUTPATIENT
Start: 2024-03-13 | End: 2025-02-09

## 2024-03-13 RX ORDER — OXYCODONE HYDROCHLORIDE 5 MG/1
5 TABLET ORAL ONCE
Refills: 0 | Status: DISCONTINUED | OUTPATIENT
Start: 2024-03-13 | End: 2024-03-17

## 2024-03-13 RX ORDER — DIPHENHYDRAMINE HCL 50 MG
25 CAPSULE ORAL EVERY 6 HOURS
Refills: 0 | Status: COMPLETED | OUTPATIENT
Start: 2024-03-13 | End: 2025-02-09

## 2024-03-13 RX ORDER — CEFAZOLIN SODIUM 1 G
2000 VIAL (EA) INJECTION EVERY 8 HOURS
Refills: 0 | Status: COMPLETED | OUTPATIENT
Start: 2024-03-13 | End: 2024-03-15

## 2024-03-13 RX ORDER — CHLORHEXIDINE GLUCONATE 213 G/1000ML
1 SOLUTION TOPICAL DAILY
Refills: 0 | Status: DISCONTINUED | OUTPATIENT
Start: 2024-03-13 | End: 2024-03-14

## 2024-03-13 RX ADMIN — ONDANSETRON 4 MILLIGRAM(S): 8 TABLET, FILM COATED ORAL at 23:53

## 2024-03-13 RX ADMIN — Medication 0.25 MILLIGRAM(S): at 16:34

## 2024-03-13 RX ADMIN — Medication 100 MILLIGRAM(S): at 22:42

## 2024-03-13 RX ADMIN — SODIUM CHLORIDE 125 MILLILITER(S): 9 INJECTION, SOLUTION INTRAVENOUS at 15:39

## 2024-03-13 RX ADMIN — Medication 1000 MILLIUNIT(S)/MIN: at 22:42

## 2024-03-13 NOTE — OB PROVIDER LABOR PROGRESS NOTE - NS_OBIHIFHRDETAILS_OBGYN_ALL_OB_FT
140/mod/+accels, large late deceleration after IUPC placed. Patient rushed to the OR and FHR found to be again in the 140s

## 2024-03-13 NOTE — OB RN DELIVERY SUMMARY - NSSELHIDDEN_OBGYN_ALL_OB_FT
None Known [NS_DeliveryAttending1_OBGYN_ALL_OB_FT:[id number]],[NS_DeliveryRN_OBGYN_ALL_OB_FT:[id number]=]

## 2024-03-13 NOTE — OB PROVIDER LABOR PROGRESS NOTE - ASSESSMENT
A/P:   - Labor: Course as above. Plan to monitor patient in the OR for 20-30 minutes. Patient given the option of primary C/S in light of cat 2 tracing, preferred to get epidural and continue to monitor. Will keep patient in the OR for close monitoring and anesthesia at bedside consenting patient for epidural.  - Fetus: cat 2  - GBS: negative  - Pain: anesthesia placing epidural    Inna Barreto, PGY-1  patient seen and brought to the OR with Dr. Aragon A/P:   - Labor: Course as above. Plan to monitor patient in the OR for 20-30 minutes. Patient given the option of primary C/S in light of cat 2 tracing, preferred to get epidural and continue to monitor. Will keep patient in the OR for close monitoring and anesthesia at bedside consenting patient for epidural.  - Fetus: cat 2  - GBS: negative  - Pain: anesthesia placing epidural    Inna Barreto, PGY-1  patient seen and brought to the OR with Dr. Aragon      Attending attestation:  At bedside for above  maximino KAT

## 2024-03-13 NOTE — OB PROVIDER H&P - HISTORY OF PRESENT ILLNESS
R1 H&P    Pt is a 27y/o  at 40+4 admitted for IOL for oligo. +FM, -LOF, -VB, -ctx  Prenatal course uncomplicated. She was seen at the office today and had an ultrasound showing oligo (MAURIZIO 3.9), so was went in for induction. She denies HA. vision changes, CP, SOB, N/V, RUQ pain.  GBS negative  EFW 3388    OBHx:   - medTOP   GynHx: denies h/o abnormal paps, STI's, fibroids, cysts  PMHx: denies  PSHx: denies  Med: PNV  All: NKDA  SH: denies alcohol, tobacco, or drug use  Psych: denies h/o anxiety or depression   R1 H&P    Pt is a 27y/o  at 40+4 admitted for IOL for oligo. +FM, -LOF, -VB, -ctx  Prenatal course uncomplicated. She was seen at the office today and had an ultrasound showing oligo (MAURIZIO 3.9), so was went in for induction. She denies HA. vision changes, CP, SOB, N/V, RUQ pain.  GBS negative  EFW 3388    OBHx:   - medTOP   GynHx: denies h/o abnormal paps, STI's, fibroids, cysts  PMHx: denies  PSHx: R shoulder surgery  Med: PNV  All: NKDA  SH: denies alcohol, tobacco, or drug use  Psych: denies h/o anxiety or depression   R1 H&P    Pt is a 29y/o  at 40+4 admitted for IOL for oligo. +FM, -LOF, -VB, -ctx  Prenatal course uncomplicated. She was seen at the office today and had an ultrasound showing oligo (MAURIZIO 3.9) as well as a prolonged decel on NST, so was went in for induction. She denies HA. vision changes, CP, SOB, N/V, RUQ pain.  GBS negative  EFW 3388    OBHx:   - medTOP   GynHx: denies h/o abnormal paps, STI's, fibroids, cysts  PMHx: denies  PSHx: R shoulder surgery  Med: PNV  All: NKDA  SH: denies alcohol, tobacco, or drug use  Psych: denies h/o anxiety or depression

## 2024-03-13 NOTE — OB PROVIDER DELIVERY SUMMARY - NSPROVIDERDELIVERYNOTE_OBGYN_ALL_OB_FT
decision made to proceed with emergent  section 2/2 recurrent prolonged fetal decelerations  betadyne splashed on patient's abdomen  viable male infant, cephalic presentation, weight 3800g, APGARS 8/9  grossly normal uters, b/l tubes and ovaries  hysterotomy closed in 1 layer with caprosyn suture    Dictation per Dr Tee decision made to proceed with emergent  section 2/2 recurrent prolonged fetal decelerations  betadyne splashed on patient's abdomen  viable male infant, cephalic presentation, weight 3800g, APGARS 8/9  grossly normal uters, b/l tubes and ovaries  hysterotomy closed in 1 layer with caprosyn suture    Dictation per Dr Tee (Dictation # [id number])

## 2024-03-13 NOTE — OB RN PATIENT PROFILE - FALL HARM RISK - UNIVERSAL INTERVENTIONS
Bed in lowest position, wheels locked, appropriate side rails in place/Call bell, personal items and telephone in reach/Instruct patient to call for assistance before getting out of bed or chair/Non-slip footwear when patient is out of bed/[place] to call system/Physically safe environment - no spills, clutter or unnecessary equipment/Purposeful Proactive Rounding/Room/bathroom lighting operational, light cord in reach
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
## 2024-03-13 NOTE — OB PROVIDER H&P - ASSESSMENT
A&P:   Labor: admit to L&D  -PO cytotec  -routine labs  -EFM/toco  -NPO, IV hydration  Fetal: cat 1 tracing, fetal status reassuring  GBS: neg  Analgesia:       Discussed with  Inna Barreto PGY-1   A&P:   Labor: admit to L&D  - Cytotec, currently declining cervical balloon but will re-address with patient  - routine labs  - EFM/toco  - clear liquids, IV hydration  - Consent form discussed including induction/augmentation of labor, possible  section, possible episiotomy, possible operative delivery with vacuum or foreceps. Patient consents signed, also agrees to blood transfusion in case of emergency.  Fetal: cat 1 tracing, fetal status reassuring  GBS: neg  Analgesia: epiPRN      Discussed with Dr. Mahesh Barreto PGY-1

## 2024-03-13 NOTE — OB PROVIDER DELIVERY SUMMARY - NSSELHIDDEN_OBGYN_ALL_OB_FT
[NS_DeliveryAttending1_OBGYN_ALL_OB_FT:[id number]],[NS_DeliveryRN_OBGYN_ALL_OB_FT:[id number]=]
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
## 2024-03-13 NOTE — OB RN DELIVERY SUMMARY - NS_SEPSISRSKCALC_OBGYN_ALL_OB_FT
EOS calculated successfully. EOS Risk Factor: 0.5/1000 live births ([facility] national incidence); GA=40w4d; Temp=98.42; ROM=1.467; GBS='Negative'; Antibiotics='No antibiotics or any antibiotics < 2 hrs prior to birth'

## 2024-03-13 NOTE — OB PROVIDER H&P - NSHPPHYSICALEXAM_GEN_ALL_CORE
EFH: 145/mod/+accels, no decels  [place]: irregular  VE:  TAUS: vertex    General: comfortable, NAD  Pulm: unlabored breathing  Abd: gravid, soft, nontender EFH: 145/mod/+accels, no decels  [place]: irregular  VE: 1.5/60/-3  TAUS: vertex    General: comfortable, NAD  Pulm: unlabored breathing  Abd: gravid, soft, nontender

## 2024-03-13 NOTE — OB RN INTRAOPERATIVE NOTE - NSSELHIDDEN_OBGYN_ALL_OB_FT
[NS_DeliveryAttending1_OBGYN_ALL_OB_FT:[id number]],[NS_DeliveryRN_OBGYN_ALL_OB_FT:[id number]=]

## 2024-03-13 NOTE — OB PROVIDER LABOR PROGRESS NOTE - NS_SUBJECTIVE/OBJECTIVE_OBGYN_ALL_OB_FT
R1 OB Labor Note    S: Patient seen and examined at bedside for large late deceleration. Pt not feeling her contractions.    T(C): 36.9 (03-13-24 @ 14:16), Max: 36.9 (03-13-24 @ 13:30)  HR: 93 (03-13-24 @ 14:16) (93 - 93)  BP: 128/80 (03-13-24 @ 14:16) (128/80 - 128/80)  RR: 16 (03-13-24 @ 14:16) (16 - 16)

## 2024-03-13 NOTE — OB PROVIDER H&P - NSLOWPPHRISK_OBGYN_A_OB
No previous uterine incision/[place] Pregnancy/Less than or equal to 4 previous vaginal births/No known bleeding disorder/No history of postpartum hemorrhage/No other PPH risks indicated

## 2024-03-13 NOTE — OB PROVIDER H&P - ATTENDING COMMENTS
Agree with above  Admit for IOL  For buccal cytotec and cervical balloon  Epidural PRN    maximino KAT

## 2024-03-13 NOTE — OB PROVIDER LABOR PROGRESS NOTE - ASSESSMENT
A/P:   - Labor:  IOL for oligo. Has not had any induction agents yet, had large decel which resolved with repositioning. Plan to move pt to labor floor from 300s. Will  patient on early epidural and possibility of pitocin and cervical balloon.  - Fetus: cat 2  - GBS: negative  - Pain: epiPRN    Inna Barreto, PGY-1  d/w Dr. Aragon

## 2024-03-13 NOTE — OB NEONATOLOGY/PEDIATRICIAN DELIVERY SUMMARY - NSPEDSNEONOTESA_OBGYN_ALL_OB_FT
Peds called to OR for category II fetal heart tracing for a 40.4 wk AGA male born via urgent CS following induction of labor due to oligohydramnios to a 30 y/o  mother. No significant maternal history. Prenatal history of oligohydramnios. Maternal labs include Blood Type O+ , HIV - , RPR NR , Rubella I , Hep B - , GBS- on 2/15. AROM at 17:20 on 3/13 with clear fluids (ROM hours: 1H28M).  Baby emerged vigorous, crying, was warmed, dried, suctioned, and stimulated with APGARS of 8/9. Mom plans to initiate breastfeeding, consents Hep B vaccine and declines circ.  Highest maternal temp: 36.9. EOS 0.08.    Physical Exam:  Gen: no acute distress, +grimace  HEENT:  anterior fontanel open soft and flat, nondysmorphic facies, no cleft lip/palate, ears normal set, no ear pits or tags, nares clinically patent  Resp: Normal respiratory effort without grunting or retractions, good air entry b/l, clear to auscultation bilaterally  Cardio: Present S1/S2, regular rate and rhythm, no murmurs  Abd: soft, non tender, non distended, umbilical cord with 3 vessels  Neuro: +palmar and plantar grasp, +suck, +marcos, normal tone  Extremities: negative álvarez and ortolani maneuvers, moving all extremities, no clavicular crepitus or stepoff  Skin: pink, warm, desquamation on b/l feet and hands  Genitals: Normal male anatomy, testicles palpable in scrotum b/l, Tyrone 1, anus patent

## 2024-03-13 NOTE — OB RN PATIENT PROFILE - WEIGHT IN KG
Hospitalist Admission Note    NAME: Marzena Connell   :  1944   MRN:  [id number]     Date/Time:  2017 10:45 AM    Patient PCP: Lillian Kumari III, DO  ________________________________________________________________________    My assessment of this patient's clinical condition and my plan of care is as follows.     Assessment / Plan:  Acute on chronic respiratory failure ( baseline 4 to 4.5 litres)  With progressive pulmonary fibrosis/ copd exacerbation  Compensatory respiratory acidosis  Underlying chronic systolic chf    Bronchodilators, levo and steroids  ( on chronic zithromax on alternate day at home, would resume on discharge)  Cont triolto, and breoellipta  Pt and family requesting for the pulmonary, would want to set up with new physician,Would consult for any further recommendations  Per wife pt would get more confused in the hospital-- may need sitter    Chest xray neg      Anemia of chronic disease: monitor  Check for work up   No signs of bleeding    Chronic systolic chf  Last known ef 45%  Cont asa, bystolic and lisinopril      Very high risk of further deterioration    Code Status:dnr  Surrogate Decision Maker:    DVT Prophylaxis: lovenox  GI Prophylaxis: not indicated    Baseline: fair functional status        Subjective:   CHIEF COMPLAINT: sob    HISTORY OF PRESENT ILLNESS:     Khadra Chiu is a 67 y.o. male who with history of pulmonary fibrosis/ copd and chf ( last known ef 40%)  [facility] following with pulmonary [facility], also sees cardiology at [facility] once in 6 months  Baseline on 4 litres, recently increased to 4.5litres per the family  Has been steady decline more so in the last few months, with coughing spells, and having been confused on and off,  Also wife noted that he increased weight, and she would give extra dose of lasix and potassium with the weight gain  And yesterday, just walking few steps sats down to loow 80s  Came to the er  Denies any chest pain or abdominal pain  No urinary or bowel complaints      We were asked to admit for work up and evaluation of the above problems. Past Medical History   Diagnosis Date    [facility] [facility]     Diastolic CHF, chronic ([facility] [address] 11/4/2015    Emphysema/COPD ([facility] [address] 9/20/2009    Essential hypertension, benign 9/20/2009    GERD (gastroesophageal reflux disease) 9/20/2009    Hypertension     Migraine headache 9/20/2009    Post-tuberculous bronchiectasis 10/17/2013    Thyroid disease      hypothyroidism        History reviewed. No pertinent past surgical history. Social History   Substance Use Topics    Smoking status: Former Smoker     Packs/day: 1.50     Years: 25.00     Types: Cigarettes     Quit date: 4/26/1979    Smokeless tobacco: Never Used    Alcohol use No        Family History   Problem Relation Age of Onset    Heart Disease Mother     Stroke Mother     Cancer Father      throat     Allergies   Allergen Reactions    Cardizem [Diltiazem Hcl] Other (comments)     Headache and constipation        Prior to Admission medications    Medication Sig Start Date End Date Taking? Authorizing Provider   guaiFENesin-codeine (ROBITUSSIN AC) 100-10 mg/5 mL solution TAKE 5ML BY MOUTH 4 TIMES A DAY AS NEEDED MAXIMUM 20ML PER DAY (GENERIC FOR ROBITUSSIN AC) 2/6/17   Theodore Guerrero III, DO   LORazepam (ATIVAN) 1 mg tablet TAKE 1/2 TO 1 BY MOUTH UP TO 3 TIMES DAILY AS NEEDED FOR ANXIETY 1/23/17   Theodore Guerrero III, DO   zolpidem (AMBIEN) 5 mg tablet Take 1-2 Tabs by mouth nightly as needed for Sleep. Max Daily Amount: 10 mg. 12/5/16   Chente Meléndez III, DO   predniSONE (DELTASONE) 20 mg tablet 60 mg daily x 3 days, then 40 mg daily x 3 days, then 20 mg daily x 3 days, then stop. 12/5/16   Theodore Guerrero III, DO   tiotropium-olodaterol (STIOLTO RESPIMAT) 2.5-2.5 mcg/actuation mist Take 2 Puffs by inhalation daily.  11/21/16   Chente Meléndez III, DO budesonide-formoterol (SYMBICORT) 160-4.5 mcg/actuation HFA inhaler Take 2 Puffs by inhalation two (2) times a day. 10/21/16   Yolette Rudolph III, DO   azithromycin (ZITHROMAX) 250 mg tablet Take 1 Tab by mouth every Monday, Wednesday, Friday. 9/2/16   Theodore Guerrero III, DO   Bacillus coagulans (PROBIOTIC, B. COAGULANS,) 10 billion cell cpDR Take  by mouth two (2) times a day. Historical Provider   levalbuterol (XOPENEX) 1.25 mg/3 mL nebu INHALE 1 VIAL VIA NEBULIZER 3 TIMES DAILY 8/1/16   Yolette Rudolph III, DO   XOPENEX HFA 45 mcg/actuation inhaler USE 2 INHALATIONS BY MOUTH EVERY 4 HOURS AS NEEDED FOR WHEEZING 7/6/16   Yolette Rudolph III, DO   potassium chloride SR (KLOR-CON 10) 10 mEq tablet Take 1 Tab by mouth two (2) times daily (with meals). Take 2nd dose when you take an extra Furosemide. Indications: HYPOKALEMIA 5/10/16   Yolette Rudolph III, DO   traZODone (DESYREL) 50 mg tablet Take 2 Tabs by mouth nightly. 5/10/16   Theodore Guerrero III, DO   lisinopril (PRINIVIL, ZESTRIL) 20 mg tablet TAKE 1 BY MOUTH DAILY 4/4/16   Bon Hawthorne MD   ipratropium (ATROVENT) 0.02 % nebulizer solution 2.5 mL by Nebulization route three (3) times daily. 3/24/16   Bon Hawthorne MD   sertraline (ZOLOFT) 50 mg tablet Take 1.5 Tabs by mouth daily. 2/17/16   Bon Hawthorne MD   levothyroxine (SYNTHROID) 50 mcg tablet TAKE 1 TABLET DAILY BEFORE BREAKFAST 2/17/16   Bon Hawthorne MD   furosemide (LASIX) 20 mg tablet Take 2 Tabs by mouth daily. Indications: EDEMA 2/17/16   Bon Hawthorne MD   aspirin delayed-release 81 mg tablet Take 81 mg by mouth daily. Historical Provider   polyethylene glycol (MIRALAX) 17 gram packet Take 17 g by mouth daily as needed. Historical Provider   nebivolol (BYSTOLIC) 2.5 mg tablet Take 2.5 mg by mouth two (2) times a day. Historical Provider   naproxen sodium (ALEVE) 220 mg tablet Take 220-440 mg by mouth daily as needed.     Historical Provider OXYGEN-AIR DELIVERY SYSTEMS by Does Not Apply route. 4 liters continuously    Historical Provider       REVIEW OF SYSTEMS:           Total of 12 systems reviewed as follows:       POSITIVE= underlined text  Negative = text not underlined  General:  fever, chills, sweats, generalized weakness, weight loss/gain,      loss of appetite   Eyes:    blurred vision, eye pain, loss of vision, double vision  ENT:    rhinorrhea, pharyngitis   Respiratory:   cough, sputum production, SOB, KOO, wheezing, pleuritic pain   Cardiology:   chest pain, palpitations, orthopnea, PND, edema, syncope   Gastrointestinal:  abdominal pain , N/V, diarrhea, dysphagia, constipation, bleeding   Genitourinary:  frequency, urgency, dysuria, hematuria, incontinence   Muskuloskeletal :  arthralgia, myalgia, back pain  Hematology:  easy bruising, nose or gum bleeding, lymphadenopathy   Dermatological: rash, ulceration, pruritis, color change / jaundice  Endocrine:   hot flashes or polydipsia   Neurological:  headache, dizziness, confusion, focal weakness, paresthesia,     Speech difficulties, memory loss, gait difficulty  Psychological: Feelings of anxiety, depression, agitation    Objective:   VITALS:    Visit Vitals    /72    Pulse 96    Temp 98.1 °F (36.7 °C)    Resp 24    Ht 5' 11\" (1.803 m)    Wt 61.2 kg (135 lb)    SpO2 94%    BMI 18.83 kg/m2       PHYSICAL EXAM:    General:    Alert, cooperative, no distress, appears stated age. HEENT: Atraumatic, anicteric sclerae, pink conjunctivae     No oral ulcers, mucosa moist, throat clear, dentition fair  Neck:  Supple, symmetrical,  thyroid: non tender  Lungs:   Clear to auscultation bilaterally. No Wheezing or Rhonchi. No rales. Chest wall:  No tenderness  No Accessory muscle use. Heart:   Regular  rhythm,  No  murmur   No edema  Abdomen:   Soft, non-tender. Not distended. Bowel sounds normal  Extremities: No cyanosis. No clubbing    Skin:     Not pale.   Not Jaundiced  No rashes   Psych:   Not depressed. Not anxious or agitated. Neurologic: EOMs intact. No facial asymmetry. No aphasia or slurred speech. Symmetrical strength, Sensation grossly intact. Alert and oriented X 4.     _______________________________________________________________________  Care Plan discussed with:    Comments   Patient y    Family  y    RN y    Care Manager                    Consultant:      _______________________________________________________________________  Expected  Disposition:   Home with Family    HH/PT/OT/RN y   SNF/LTC    SOTO    ________________________________________________________________________  TOTAL TIME:  72 Minutes    Critical Care Provided     Minutes non procedure based      Comments    y Reviewed previous records   >50% of visit spent in counseling and coordination of care y Discussion with patient and/or family and questions answered       ________________________________________________________________________  Signed: Andres Ritter MD    Procedures: see electronic medical records for all procedures/Xrays and details which were not copied into this note but were reviewed prior to creation of Plan.     LAB DATA REVIEWED:    Recent Results (from the past 24 hour(s))   EKG, 12 LEAD, INITIAL    Collection Time: 02/09/17  7:38 AM   Result Value Ref Range    Ventricular Rate 94 BPM    Atrial Rate 79 BPM    P-R Interval 180 ms    QRS Duration 108 ms    Q-T Interval 414 ms    QTC Calculation (Bezet) 517 ms    Calculated R Axis 86 degrees    Calculated T Axis 69 degrees    Diagnosis       Sinus rhythm with frequent and consecutive premature ventricular complexes  Prolonged QT  Abnormal ECG  When compared with ECG of 07-JUL-2016 14:01,  premature ventricular complexes are now present  premature atrial complexes are no longer present     CBC WITH AUTOMATED DIFF    Collection Time: 02/09/17  7:52 AM   Result Value Ref Range    WBC 8.0 4.1 - 11.1 K/uL    RBC 3.88 (L) 4.10 - 5.70 M/uL HGB 8.8 (L) 12.1 - 17.0 g/dL    HCT 30.7 (L) 36.6 - 50.3 %    MCV 79.1 (L) 80.0 - 99.0 FL    MCH 22.7 (L) 26.0 - 34.0 PG    MCHC 28.7 (L) 30.0 - 36.5 g/dL    RDW 15.6 (H) 11.5 - 14.5 %    PLATELET 494 809 - 888 K/uL    NEUTROPHILS 63 32 - 75 %    LYMPHOCYTES 14 12 - 49 %    MONOCYTES 12 5 - 13 %    EOSINOPHILS 10 (H) 0 - 7 %    BASOPHILS 1 0 - 1 %    ABS. NEUTROPHILS 5.0 1.8 - 8.0 K/UL    ABS. LYMPHOCYTES 1.1 0.8 - 3.5 K/UL    ABS. MONOCYTES 1.0 0.0 - 1.0 K/UL    ABS. EOSINOPHILS 0.8 (H) 0.0 - 0.4 K/UL    ABS. BASOPHILS 0.1 0.0 - 0.1 K/UL    RBC COMMENTS HYPOCHROMIA  2+        RBC COMMENTS MICROCYTOSIS  PRESENT        RBC COMMENTS POIKILOCYTOSIS  PRESENT        RBC COMMENTS POLYCHROMASIA  PRESENT        WBC COMMENTS REACTIVE LYMPHS     METABOLIC PANEL, COMPREHENSIVE    Collection Time: 02/09/17  7:52 AM   Result Value Ref Range    Sodium 140 136 - 145 mmol/L    Potassium 3.9 3.5 - 5.1 mmol/L    Chloride 99 97 - 108 mmol/L    CO2 36 (H) 21 - 32 mmol/L    Anion gap 5 5 - 15 mmol/L    Glucose 102 (H) 65 - 100 mg/dL    BUN 15 6 - 20 MG/DL    Creatinine 0.80 0.70 - 1.30 MG/DL    BUN/Creatinine ratio 19 12 - 20      GFR est AA >60 >60 ml/min/1.73m2    GFR est non-AA >60 >60 ml/min/1.73m2    Calcium 8.2 (L) 8.5 - 10.1 MG/DL    Bilirubin, total 0.3 0.2 - 1.0 MG/DL    ALT (SGPT) 28 12 - 78 U/L    AST (SGOT) 27 15 - 37 U/L    Alk.  phosphatase 67 45 - 117 U/L    Protein, total 8.0 6.4 - 8.2 g/dL    Albumin 3.4 (L) 3.5 - 5.0 g/dL    Globulin 4.6 (H) 2.0 - 4.0 g/dL    A-G Ratio 0.7 (L) 1.1 - 2.2     TROPONIN I    Collection Time: 02/09/17  7:52 AM   Result Value Ref Range    Troponin-I, Qt. <0.04 <0.05 ng/mL   CK W/ REFLX CKMB    Collection Time: 02/09/17  7:52 AM   Result Value Ref Range    CK 89 39 - 308 U/L   PRO-BNP    Collection Time: 02/09/17  7:52 AM   Result Value Ref Range    NT pro-BNP 3971 (H) 0 - 125 PG/ML   BLOOD GAS, ARTERIAL    Collection Time: 02/09/17 10:04 AM   Result Value Ref Range    pH 7.42 7.35 - 7.45 PCO2 57 (H) 35.0 - 45.0 mmHg    PO2 87 80 - 100 mmHg    O2 SAT 97 92 - 97 %    BICARBONATE 36 (H) 22 - 26 mmol/L    BASE EXCESS 8.9 mmol/L    O2 METHOD NASAL O2      O2 FLOW RATE 4.50 L/min    SPONTANEOUS RATE 18.0      Sample source ARTERIAL      SITE RIGHT RADIAL      SHAW'S TEST YES 117.5

## 2024-03-14 ENCOUNTER — TRANSCRIPTION ENCOUNTER (OUTPATIENT)
Age: 29
End: 2024-03-14

## 2024-03-14 LAB
BASOPHILS # BLD AUTO: 0.03 K/UL — SIGNIFICANT CHANGE UP (ref 0–0.2)
BASOPHILS NFR BLD AUTO: 0.2 % — SIGNIFICANT CHANGE UP (ref 0–2)
EOSINOPHIL # BLD AUTO: 0 K/UL — SIGNIFICANT CHANGE UP (ref 0–0.5)
EOSINOPHIL NFR BLD AUTO: 0 % — SIGNIFICANT CHANGE UP (ref 0–6)
HCT VFR BLD CALC: 35.1 % — SIGNIFICANT CHANGE UP (ref 34.5–45)
HGB BLD-MCNC: 11.7 G/DL — SIGNIFICANT CHANGE UP (ref 11.5–15.5)
IANC: 13.38 K/UL — HIGH (ref 1.8–7.4)
IMM GRANULOCYTES NFR BLD AUTO: 1 % — HIGH (ref 0–0.9)
LYMPHOCYTES # BLD AUTO: 1.52 K/UL — SIGNIFICANT CHANGE UP (ref 1–3.3)
LYMPHOCYTES # BLD AUTO: 9.5 % — LOW (ref 13–44)
MCHC RBC-ENTMCNC: 29.3 PG — SIGNIFICANT CHANGE UP (ref 27–34)
MCHC RBC-ENTMCNC: 33.3 GM/DL — SIGNIFICANT CHANGE UP (ref 32–36)
MCV RBC AUTO: 87.8 FL — SIGNIFICANT CHANGE UP (ref 80–100)
MONOCYTES # BLD AUTO: 0.84 K/UL — SIGNIFICANT CHANGE UP (ref 0–0.9)
MONOCYTES NFR BLD AUTO: 5.3 % — SIGNIFICANT CHANGE UP (ref 2–14)
NEUTROPHILS # BLD AUTO: 13.38 K/UL — HIGH (ref 1.8–7.4)
NEUTROPHILS NFR BLD AUTO: 84 % — HIGH (ref 43–77)
NRBC # BLD: 0 /100 WBCS — SIGNIFICANT CHANGE UP (ref 0–0)
NRBC # FLD: 0 K/UL — SIGNIFICANT CHANGE UP (ref 0–0)
PLATELET # BLD AUTO: 279 K/UL — SIGNIFICANT CHANGE UP (ref 150–400)
RBC # BLD: 4 M/UL — SIGNIFICANT CHANGE UP (ref 3.8–5.2)
RBC # FLD: 14.3 % — SIGNIFICANT CHANGE UP (ref 10.3–14.5)
WBC # BLD: 15.93 K/UL — HIGH (ref 3.8–10.5)
WBC # FLD AUTO: 15.93 K/UL — HIGH (ref 3.8–10.5)

## 2024-03-14 PROCEDURE — 74018 RADEX ABDOMEN 1 VIEW: CPT | Mod: 26

## 2024-03-14 RX ORDER — ONDANSETRON 8 MG/1
4 TABLET, FILM COATED ORAL ONCE
Refills: 0 | Status: COMPLETED | OUTPATIENT
Start: 2024-03-14 | End: 2024-03-14

## 2024-03-14 RX ORDER — HEPARIN SODIUM 5000 [USP'U]/ML
10000 INJECTION INTRAVENOUS; SUBCUTANEOUS EVERY 12 HOURS
Refills: 0 | Status: DISCONTINUED | OUTPATIENT
Start: 2024-03-14 | End: 2024-03-17

## 2024-03-14 RX ORDER — IBUPROFEN 200 MG
600 TABLET ORAL EVERY 6 HOURS
Refills: 0 | Status: DISCONTINUED | OUTPATIENT
Start: 2024-03-14 | End: 2024-03-17

## 2024-03-14 RX ORDER — FERROUS SULFATE 325(65) MG
325 TABLET ORAL DAILY
Refills: 0 | Status: DISCONTINUED | OUTPATIENT
Start: 2024-03-14 | End: 2024-03-17

## 2024-03-14 RX ORDER — SODIUM CHLORIDE 9 MG/ML
500 INJECTION, SOLUTION INTRAVENOUS ONCE
Refills: 0 | Status: COMPLETED | OUTPATIENT
Start: 2024-03-14 | End: 2024-03-14

## 2024-03-14 RX ORDER — METOCLOPRAMIDE HCL 10 MG
10 TABLET ORAL ONCE
Refills: 0 | Status: COMPLETED | OUTPATIENT
Start: 2024-03-14 | End: 2024-03-14

## 2024-03-14 RX ORDER — DIPHENHYDRAMINE HCL 50 MG
25 CAPSULE ORAL EVERY 6 HOURS
Refills: 0 | Status: DISCONTINUED | OUTPATIENT
Start: 2024-03-14 | End: 2024-03-17

## 2024-03-14 RX ORDER — SENNA PLUS 8.6 MG/1
2 TABLET ORAL AT BEDTIME
Refills: 0 | Status: DISCONTINUED | OUTPATIENT
Start: 2024-03-14 | End: 2024-03-17

## 2024-03-14 RX ORDER — HEPARIN SODIUM 5000 [USP'U]/ML
1000 INJECTION INTRAVENOUS; SUBCUTANEOUS EVERY 12 HOURS
Refills: 0 | Status: DISCONTINUED | OUTPATIENT
Start: 2024-03-14 | End: 2024-03-14

## 2024-03-14 RX ORDER — IBUPROFEN 200 MG
1 TABLET ORAL
Qty: 0 | Refills: 0 | DISCHARGE
Start: 2024-03-14

## 2024-03-14 RX ORDER — ACETAMINOPHEN 500 MG
3 TABLET ORAL
Qty: 0 | Refills: 0 | DISCHARGE
Start: 2024-03-14

## 2024-03-14 RX ADMIN — Medication 30 MILLIGRAM(S): at 15:30

## 2024-03-14 RX ADMIN — Medication 100 MILLIGRAM(S): at 06:33

## 2024-03-14 RX ADMIN — HEPARIN SODIUM 10000 UNIT(S): 5000 INJECTION INTRAVENOUS; SUBCUTANEOUS at 02:59

## 2024-03-14 RX ADMIN — Medication 30 MILLIGRAM(S): at 20:28

## 2024-03-14 RX ADMIN — Medication 975 MILLIGRAM(S): at 17:38

## 2024-03-14 RX ADMIN — Medication 30 MILLIGRAM(S): at 14:50

## 2024-03-14 RX ADMIN — Medication 100 MILLIGRAM(S): at 21:52

## 2024-03-14 RX ADMIN — Medication 30 MILLIGRAM(S): at 21:00

## 2024-03-14 RX ADMIN — HEPARIN SODIUM 10000 UNIT(S): 5000 INJECTION INTRAVENOUS; SUBCUTANEOUS at 14:49

## 2024-03-14 RX ADMIN — Medication 975 MILLIGRAM(S): at 18:02

## 2024-03-14 RX ADMIN — Medication 1 TABLET(S): at 11:00

## 2024-03-14 RX ADMIN — Medication 25 MILLIGRAM(S): at 20:28

## 2024-03-14 RX ADMIN — Medication 10 MILLIGRAM(S): at 01:32

## 2024-03-14 RX ADMIN — Medication 325 MILLIGRAM(S): at 11:01

## 2024-03-14 RX ADMIN — Medication 975 MILLIGRAM(S): at 11:05

## 2024-03-14 RX ADMIN — Medication 975 MILLIGRAM(S): at 11:40

## 2024-03-14 RX ADMIN — ONDANSETRON 4 MILLIGRAM(S): 8 TABLET, FILM COATED ORAL at 06:38

## 2024-03-14 RX ADMIN — Medication 100 MILLIGRAM(S): at 14:47

## 2024-03-14 RX ADMIN — SODIUM CHLORIDE 500 MILLILITER(S): 9 INJECTION, SOLUTION INTRAVENOUS at 05:41

## 2024-03-14 NOTE — PROGRESS NOTE ADULT - SUBJECTIVE AND OBJECTIVE BOX
INTERVAL HPI/OVERNIGHT EVENTS:  28y Female s/p c section under epidural anesthesia with duramorph for post op analgesia     Vital Signs Last 24 Hrs  T(C): 37 (14 Mar 2024 05:45), Max: 37 (14 Mar 2024 02:03)  T(F): 98.6 (14 Mar 2024 05:45), Max: 98.6 (14 Mar 2024 02:03)  HR: 106 (14 Mar 2024 05:45) (87 - 141)  BP: 118/60 (14 Mar 2024 05:45) (106/52 - 151/112)  BP(mean): 75 (14 Mar 2024 00:30) (73 - 121)  RR: 18 (14 Mar 2024 05:45) (13 - 27)  SpO2: 98% (14 Mar 2024 05:45) (75% - 100%)    Parameters below as of 14 Mar 2024 02:03  Patient On (Oxygen Delivery Method): room air            Patient's overall anesthesia satisfaction: Positive    Patients pain is well controlled with IT duramorph    No respiratory events overnight    No pruritis at this time    Patient doing well     No headache      No residual numbness or weakness, sensory and motor function intact.    No anesthetic complications or complaints noted or reported          .             INTERVAL HPI/OVERNIGHT EVENTS:  28y Female s/p c section under epidural anesthesia with duramorph for post op analgesia     Vital Signs Last 24 Hrs  T(C): 37 (14 Mar 2024 05:45), Max: 37 (14 Mar 2024 02:03)  T(F): 98.6 (14 Mar 2024 05:45), Max: 98.6 (14 Mar 2024 02:03)  HR: 106 (14 Mar 2024 05:45) (87 - 141)  BP: 118/60 (14 Mar 2024 05:45) (106/52 - 151/112)  BP(mean): 75 (14 Mar 2024 00:30) (73 - 121)  RR: 18 (14 Mar 2024 05:45) (13 - 27)  SpO2: 98% (14 Mar 2024 05:45) (75% - 100%)    Parameters below as of 14 Mar 2024 02:03  Patient On (Oxygen Delivery Method): room air  Patients pain is well controlled with epidural duramorph    No respiratory events overnight    No pruritis at this time    Patient's overall anesthesia satisfaction: Positive    Addendum: Please note follow up note per trauma team as follows:  CT head, c-spine, L-spine reviewed and all negative for injuries. No thoracic imaging was obtained by OB team.  Patient re-evaluated after spinal anesthesia wore off  Full neuro exam intact. CN II-XII intact, no motor or sensory deficits throughout, 5/5 strength throughout  Cervical collar cleared  No need for thoracic imaging given neuro exam intact at this time.   Surgery to sign off.          Patient doing well     No headache      No residual numbness or weakness, sensory and motor function intact.    No anesthetic complications or complaints noted or reported          .

## 2024-03-14 NOTE — PROGRESS NOTE ADULT - SUBJECTIVE AND OBJECTIVE BOX
R1 Progress Note    Patient seen and examined at bedside, no acute overnight events. No acute complaints, pain well controlled. Patient is ambulating and tolerating regular diet. Has not yet passed flatus. Marks is still in place. Denies CP, SOB, N/V, HA, blurred vision, epigastric pain. Denies numbness, tingling, weakness, difficulty w/ movement, neck/back pain.    Vital Signs Last 24 Hours  T(C): 37 (03-14-24 @ 05:45), Max: 37 (03-14-24 @ 02:03)  HR: 106 (03-14-24 @ 05:45) (87 - 141)  BP: 118/60 (03-14-24 @ 05:45) (106/52 - 151/112)  RR: 18 (03-14-24 @ 05:45) (13 - 27)  SpO2: 98% (03-14-24 @ 05:45) (75% - 100%)    I&O's Summary    13 Mar 2024 07:01  -  14 Mar 2024 06:29  --------------------------------------------------------  IN: 2625 mL / OUT: 883 mL / NET: 1742 mL        Physical Exam:  General: NAD  Abdomen: Soft, non-tender, non-distended, fundus firm  Neuro: symmetric movements, 5/5 strength, sensation equal bilaterally   Incision: Pfannenstiel incision CDI, subcuticular suture closure  Pelvic: Lochia wnl    Labs:    Blood Type: O Positive  Antibody Screen: Negative  RPR: Negative               12.7   9.56  )-----------( 282      ( 03-13 @ 14:13 )             38.8         MEDICATIONS  (STANDING):  acetaminophen     Tablet .. 975 milliGRAM(s) Oral <User Schedule>  ceFAZolin   IVPB 2000 milliGRAM(s) IV Intermittent every 8 hours  diphtheria/tetanus/pertussis (acellular) Vaccine (Adacel) 0.5 milliLiter(s) IntraMuscular once  ferrous    sulfate 325 milliGRAM(s) Oral daily  heparin   Injectable 89162 Unit(s) SubCutaneous every 12 hours  ibuprofen  Tablet. 600 milliGRAM(s) Oral every 6 hours  influenza   Vaccine 0.5 milliLiter(s) IntraMuscular once  ketorolac   Injectable 30 milliGRAM(s) IV Push every 6 hours  lactated ringers. 1000 milliLiter(s) (125 mL/Hr) IV Continuous <Continuous>  ondansetron Injectable 4 milliGRAM(s) IV Push once  oxytocin Infusion 333.333 milliUNIT(s)/Min (1000 mL/Hr) IV Continuous <Continuous>  prenatal multivitamin 1 Tablet(s) Oral daily  senna 2 Tablet(s) Oral at bedtime    MEDICATIONS  (PRN):  diphenhydrAMINE 25 milliGRAM(s) Oral every 6 hours PRN Pruritus  lanolin Ointment 1 Application(s) Topical every 6 hours PRN Sore Nipples  magnesium hydroxide Suspension 30 milliLiter(s) Oral two times a day PRN Constipation  oxyCODONE    IR 5 milliGRAM(s) Oral once PRN Moderate to Severe Pain (4-10)  oxyCODONE    IR 5 milliGRAM(s) Oral every 3 hours PRN Moderate to Severe Pain (4-10)  simethicone 80 milliGRAM(s) Chew every 4 hours PRN Gas

## 2024-03-14 NOTE — DISCHARGE NOTE OB - MEDICATION SUMMARY - MEDICATIONS TO TAKE
I will START or STAY ON the medications listed below when I get home from the hospital:    Electronic blood pressure cuff  -- Please take your blood pressure three times a day. Please call your MD if your blood pressure if 140/90 or higher. Please go to the hospital if your blood pressure is 160/110 or higher.  -- Indication: For gestational hypertension    ibuprofen 600 mg oral tablet  -- 1 tab(s) by mouth every 6 hours as needed for  moderate pain  -- Indication: For moderate pain    acetaminophen 325 mg oral tablet  -- 3 tab(s) by mouth every 6 hours as needed for  mild pain  -- Indication: For mild pain   I will START or STAY ON the medications listed below when I get home from the hospital:    ibuprofen 600 mg oral tablet  -- 1 tab(s) by mouth every 6 hours as needed for  moderate pain  -- Indication: For moderate pain    acetaminophen 325 mg oral tablet  -- 3 tab(s) by mouth every 6 hours as needed for  mild pain  -- Indication: For mild pain

## 2024-03-14 NOTE — PROVIDER CONTACT NOTE (FALL NOTIFICATION) - BACKGROUND
pt placed on x-ray board by ADONIS Chong, MD Tee, CST M. Pompee and MD Aragon. pt stable on board, bed leveled by ALEJANDRO Nguyen. Staff to exit room as per XRAY protocols

## 2024-03-14 NOTE — DISCHARGE NOTE OB - CARE PLAN
1 Principal Discharge DX:	 delivery delivered  Assessment and plan of treatment:	After discharge, please stay on pelvic rest for 6 weeks, meaning no sexual intercourse, no tampons and no douching.  No driving for 2 weeks as women can loose a lot of blood during delivery and there is a possibility of being lightheaded/fainting.  No lifting objects heavier than baby for two weeks.  Expect to have vaginal bleeding/spotting for up to six weeks.  The bleeding should get lighter and more white/light brown with time.  For bleeding soaking more than a pad an hour or passing clots greater than the size of your fist, come in to the emergency department.    Follow up in OB office in 1-2 weeks for incision check.  Call for noticeable increase in redness or swelling at incision, discharge from incision, or opening of skin at incision site  Secondary Diagnosis:	Gestational hypertension  Assessment and plan of treatment:	 your blood pressure monitor from [facility] on 1st floor of [facility]. Follow-up in your OB office within 1 week for a blood pressure check.   Please take your blood pressure 3x/day. Call your doctor if your blood pressure is 140 (top number) OR 90 (bottom number) or higher. Return to hospital if your blood pressure is 160 (top number)  (bottom number) or higher. Call your doctor if you experience symptoms such as headache, blurry vision, epigastric pain, or nausea/vomiting.

## 2024-03-14 NOTE — DISCHARGE NOTE OB - PLAN OF CARE
After discharge, please stay on pelvic rest for 6 weeks, meaning no sexual intercourse, no tampons and no douching.  No driving for 2 weeks as women can loose a lot of blood during delivery and there is a possibility of being lightheaded/fainting.  No lifting objects heavier than baby for two weeks.  Expect to have vaginal bleeding/spotting for up to six weeks.  The bleeding should get lighter and more white/light brown with time.  For bleeding soaking more than a pad an hour or passing clots greater than the size of your fist, come in to the emergency department.    Follow up in OB office in 1-2 weeks for incision check.  Call for noticeable increase in redness or swelling at incision, discharge from incision, or opening of skin at incision site  your blood pressure monitor from [facility] on 1st floor of [facility]. Follow-up in your OB office within 1 week for a blood pressure check.   Please take your blood pressure 3x/day. Call your doctor if your blood pressure is 140 (top number) OR 90 (bottom number) or higher. Return to hospital if your blood pressure is 160 (top number)  (bottom number) or higher. Call your doctor if you experience symptoms such as headache, blurry vision, epigastric pain, or nausea/vomiting.

## 2024-03-14 NOTE — PROVIDER CONTACT NOTE (FALL NOTIFICATION) - ASSESSMENT
pt witnessed fall by x-ray tech Brandon, pt slide to floor onto buttox, pt landed upright and did not hit head or LOC, pt AOx4,

## 2024-03-14 NOTE — PROVIDER CONTACT NOTE (FALL NOTIFICATION) - ACTION/TREATMENT ORDERED:
Pt in c-collar by MD Aguilera, transferred to [facility] onto [facility] then transferred to [facility] where Xray completed and pt transferred to [facility] troy on MD Tee and MD Martin

## 2024-03-14 NOTE — DISCHARGE NOTE OB - CARE PROVIDER_API CALL
Aldo Leong  Obstetrics and Gynecology  [address]  [place] [address]  Phone: [phone]  Fax: [phone]  Follow Up Time:

## 2024-03-14 NOTE — DISCHARGE NOTE OB - HOSPITAL COURSE
The pt underwent IOL for oligohydramnios. There was a fetal bradycardia for which pt underwent an emergent  delivery on 3/13/24 with delivery of male . While the pt was positioned for post operative Xray before the incision was fully closed, (because there was not formal count prior to surgery), the pt slipped off OR table onto the floor. She was cleared from C collar and no injuries were identified. She received 48 hours of antibiotics postoperatively. She recovered appropriately, had stable bp's. The 's bilirubin was elevated on pod #3 so pt remained hospitalized until day 4. On the morning of day $ she complained of left lower extremity swelling >right. Bilateral dopplers were neg and the pt was discharged home.

## 2024-03-14 NOTE — DISCHARGE NOTE OB - NS MD DC FALL RISK RISK
For information on Fall & Injury Prevention, visit: [email or web address] OR  [email or web address] OR  https://www.cdc.gov/steadi/patient.html

## 2024-03-14 NOTE — DISCHARGE NOTE OB - PATIENT PORTAL LINK FT
You can access the FollowMyHealth Patient Portal offered by [facility] by registering at the following website: [email or web address]. By joining [employer]’s FollowMyHealth portal, you will also be able to view your health information using other applications (apps) compatible with our system.

## 2024-03-14 NOTE — PROVIDER CONTACT NOTE (FALL NOTIFICATION) - RECOMMENDATIONS
Code fall-level 2 @ 2000, Fall team arrived at @2004.  steri strips/ pressure dressing placed over pt incision. pt placed on yellow backboard on floor with neck stabilization by MD Aguilera.

## 2024-03-14 NOTE — PROGRESS NOTE ADULT - ASSESSMENT
29y/o  POD#1 from pLTCS (EBL 303mL) for Cat 2 tracing. Pt s/p code fall in OR during X ray for instrument checks given emergent nature of procedure. Patient is hemodynamically stable and progressing well post-op.    #S/p code fall   - Pt cleared of c-spine by surgery team   - CT head, cervical spine, and lumbar spine neg  - No neuro deficits   - Cont to monitor     #Postpartum state  - Ancef x24 hours given emergent  section  - Continue with po analgesia  - Increase ambulation  - Continue regular diet  - Check CBC  - Incision dressing removed  - DVT prophylaxis with 5000u heparin    Aga Erickson PGY-1

## 2024-03-15 RX ORDER — OXYCODONE HYDROCHLORIDE 5 MG/1
5 TABLET ORAL
Refills: 0 | Status: DISCONTINUED | OUTPATIENT
Start: 2024-03-15 | End: 2024-03-17

## 2024-03-15 RX ADMIN — Medication 600 MILLIGRAM(S): at 17:38

## 2024-03-15 RX ADMIN — Medication 975 MILLIGRAM(S): at 09:07

## 2024-03-15 RX ADMIN — Medication 100 MILLIGRAM(S): at 06:03

## 2024-03-15 RX ADMIN — Medication 975 MILLIGRAM(S): at 06:35

## 2024-03-15 RX ADMIN — Medication 1 TABLET(S): at 12:43

## 2024-03-15 RX ADMIN — Medication 975 MILLIGRAM(S): at 06:04

## 2024-03-15 RX ADMIN — Medication 600 MILLIGRAM(S): at 23:17

## 2024-03-15 RX ADMIN — SENNA PLUS 2 TABLET(S): 8.6 TABLET ORAL at 23:17

## 2024-03-15 RX ADMIN — OXYCODONE HYDROCHLORIDE 5 MILLIGRAM(S): 5 TABLET ORAL at 12:43

## 2024-03-15 RX ADMIN — Medication 975 MILLIGRAM(S): at 08:37

## 2024-03-15 RX ADMIN — HEPARIN SODIUM 10000 UNIT(S): 5000 INJECTION INTRAVENOUS; SUBCUTANEOUS at 17:39

## 2024-03-15 RX ADMIN — Medication 600 MILLIGRAM(S): at 23:50

## 2024-03-15 RX ADMIN — Medication 975 MILLIGRAM(S): at 20:12

## 2024-03-15 RX ADMIN — Medication 975 MILLIGRAM(S): at 20:50

## 2024-03-15 RX ADMIN — MAGNESIUM HYDROXIDE 30 MILLILITER(S): 400 TABLET, CHEWABLE ORAL at 08:37

## 2024-03-15 RX ADMIN — Medication 100 MILLIGRAM(S): at 16:30

## 2024-03-15 RX ADMIN — HEPARIN SODIUM 10000 UNIT(S): 5000 INJECTION INTRAVENOUS; SUBCUTANEOUS at 06:03

## 2024-03-15 RX ADMIN — Medication 325 MILLIGRAM(S): at 12:43

## 2024-03-15 NOTE — PROGRESS NOTE ADULT - SUBJECTIVE AND OBJECTIVE BOX
SUBJECTIVE:    Pain: Controlled    Complaints:  Offered no Complaints    MILESTONES:    Alert and Oriented x 3  [ x ]  Out of bed/ ambulating. [ x ]  Flatus:   Positive [ X ]  Negative [  ]  Bowel movement  [  ] Positive [  ] Negative   Voiding [x  ] Due to void [  ]   Marks/Indwelling catheter in place [  ]  Diet: Regular [ x ]  Clears [  ]  NPO [  ]    Infant feeding:  Breast [ X ]   Bottle [  ]  Both [  ]  Feeding related issues and/or concerns:      OBJECTIVE:  T(C): 36.7 (03-15-24 @ 06:02), Max: 37 (24 @ 18:00)  HR: 97 (03-15-24 @ 06:02) (97 - 105)  BP: 128/64 (03-15-24 @ 06:02) (101/60 - 132/72)  RR: 18 (03-15-24 @ 06:02) (17 - 18)  SpO2: 97% (03-15-24 @ 06:02) (97% - 100%)  Wt(kg): --                        11.7   15.93 )-----------( 279      ( 14 Mar 2024 05:35 )             35.1           Blood Type: O Positive    RPR: Negative          MEDICATIONS  (STANDING):  acetaminophen     Tablet .. 975 milliGRAM(s) Oral <User Schedule>  ceFAZolin   IVPB 2000 milliGRAM(s) IV Intermittent every 8 hours  diphtheria/tetanus/pertussis (acellular) Vaccine (Adacel) 0.5 milliLiter(s) IntraMuscular once  ferrous    sulfate 325 milliGRAM(s) Oral daily  heparin   Injectable 19540 Unit(s) SubCutaneous every 12 hours  ibuprofen  Tablet. 600 milliGRAM(s) Oral every 6 hours  influenza   Vaccine 0.5 milliLiter(s) IntraMuscular once  lactated ringers. 1000 milliLiter(s) (125 mL/Hr) IV Continuous <Continuous>  oxytocin Infusion 333.333 milliUNIT(s)/Min (1000 mL/Hr) IV Continuous <Continuous>  prenatal multivitamin 1 Tablet(s) Oral daily  senna 2 Tablet(s) Oral at bedtime    MEDICATIONS  (PRN):  diphenhydrAMINE 25 milliGRAM(s) Oral every 6 hours PRN Pruritus  lanolin Ointment 1 Application(s) Topical every 6 hours PRN Sore Nipples  magnesium hydroxide Suspension 30 milliLiter(s) Oral two times a day PRN Constipation  oxyCODONE    IR 5 milliGRAM(s) Oral once PRN Moderate to Severe Pain (4-10)  oxyCODONE    IR 5 milliGRAM(s) Oral every 3 hours PRN Moderate to Severe Pain (4-10)  simethicone 80 milliGRAM(s) Chew every 4 hours PRN Gas        ASSESSMENT:    28y     G  2    P   1011      PO Day#  2        Delivery: Primary [ X ]    Repeat [  ]      QBL - 303      S/P CODE Fall. ( See associated documentation/consults..)                                    Indication of procedure: Cat 2 tracing    Condition: Stable    Past Medical History significant for: HPI:  R1 H&P    Pt is a 29y/o  at 40+4 admitted for IOL for oligo. +FM, -LOF, -VB, -ctx  Prenatal course uncomplicated. She was seen at the office today and had an ultrasound showing oligo (MAURIZIO 3.9) as well as a prolonged decel on NST, so was went in for induction. She denies HA. vision changes, CP, SOB, N/V, RUQ pain.  GBS negative  EFW 3388    OBHx:   - medTOP   GynHx: denies h/o abnormal paps, STI's, fibroids, cysts  PMHx: denies  PSHx: R shoulder surgery  Med: PNV  All: NKDA  SH: denies alcohol, tobacco, or drug use  Psych: denies h/o anxiety or depression   (13 Mar 2024 13:39)      Current Issues:    Breasts:  Soft [x  ]   Engorged [  ]  Nipples:  Abdomen: Soft [ x ]   Distended [  ] Nontender [  ]     Bowel sounds :  Present [  ]  Absent [  ]   Fundus:  Firm [x  ]  Boggy [  ]    Abdominal incision: Clean, Dry and Intact [x  ]  Staples [  ] Steri Strips [  ] Dermabond [ X ] Sutures [  ]         Swelling noted on the [employer]. Ice packs were offered.    Patient wearing abdominal binder for support.    Vaginal: Lochia:  Heavy [  ]  Moderate [ x ]   Scant [  ]    Extremities: Edema [ X ] Negative Wilma's Sign [ X ] Nontender Adán  [ x ] Positive pedal pulses [  ]    Other relevant physical exam findings:      PLAN:    Plan: Increase ambulation, analgesia PRN and pain medication protocol standing oxycodone, ibuprofen and acetaminophen.    Diet: Regular diet    Continue routine post-operative and postpartum care.     GHTN - This patient met criteria for GHTN. She was reminded to  the    script  that was sent to the [facility] yesterday.  She is aware of the criteria for monitoring and reporting her B/P's to her OB.    Discharge Planning [ x ]    For Possible Discharge Today or Tomorrow  [  X  ]    Consults:  Social Work [  ]  Lactation [ X  ]  Other [         ]

## 2024-03-15 NOTE — PROGRESS NOTE ADULT - SUBJECTIVE AND OBJECTIVE BOX
This patient had was breastfeeding her baby, sitting in the chair,  during rounds this morning.  VSS, Afebrile  Patient stated that she felt fine.  Unable to assess at this time.  Will check back later.  Continue routine Post Op Care.

## 2024-03-15 NOTE — PROGRESS NOTE ADULT - SUBJECTIVE AND OBJECTIVE BOX
Patient seen and examined at bedside, resting comfortably in no acute distress. Denies fever, chills, nausea or vomiting. She is tolerating a regular diet. She is ambulating without difficulty and voiding spontaneously. Pain is well controlled. Bleeding is less than a normal menses. Reports passing gas but has not yet had a bowel movement.    Physical Examination:  Vital Signs: Vital Signs Last 24 Hrs  T(C): 36.6 (15 Mar 2024 10:34), Max: 37 (14 Mar 2024 18:00)  T(F): 97.8 (15 Mar 2024 10:34), Max: 98.6 (14 Mar 2024 18:00)  HR: 100 (15 Mar 2024 10:34) (97 - 105)  BP: 119/69 (15 Mar 2024 10:34) (101/60 - 132/72)  BP(mean): --  RR: 18 (15 Mar 2024 10:34) (17 - 18)  SpO2: 100% (15 Mar 2024 10:34) (97% - 100%)    Parameters below as of 14 Mar 2024 22:00  Patient On (Oxygen Delivery Method): room air      General: alert and oriented, no acute distress  Cardio: regular rate and rhythm  Respiratory: non labored respirations  Abdomen: soft, non-tender, non-distended; bowel sounds present; uterus firm, palpated below the umbilicus; incision clean, dry and intact, surrounding skin non erythematous  VE: minimal lochia noted  Musculoskeletal: No erythema/edema, no calf tenderness bilaterally    MEDICATIONS  (STANDING):  acetaminophen     Tablet .. 975 milliGRAM(s) Oral <User Schedule>  ceFAZolin   IVPB 2000 milliGRAM(s) IV Intermittent every 8 hours  diphtheria/tetanus/pertussis (acellular) Vaccine (Adacel) 0.5 milliLiter(s) IntraMuscular once  ferrous    sulfate 325 milliGRAM(s) Oral daily  heparin   Injectable 58879 Unit(s) SubCutaneous every 12 hours  ibuprofen  Tablet. 600 milliGRAM(s) Oral every 6 hours  influenza   Vaccine 0.5 milliLiter(s) IntraMuscular once  lactated ringers. 1000 milliLiter(s) (125 mL/Hr) IV Continuous <Continuous>  oxytocin Infusion 333.333 milliUNIT(s)/Min (1000 mL/Hr) IV Continuous <Continuous>  prenatal multivitamin 1 Tablet(s) Oral daily  senna 2 Tablet(s) Oral at bedtime      Labs:  Blood type: O Positive  Rubella IgG: RPR: Negative                          11.7   15.93<H> >-----------< 279    ( 03-14 @ 05:35 )             35.1                        12.7   9.56 >-----------< 282    ( 03-13 @ 14:13 )             38.8      Assessment and Plan:   27yo s/p pLTCS on 3/13 now POD#2.  Patient is stable and doing well post-partum.   Blood type: O+    gHTN  - BPs well controlled  - HELLP labs wnl  - continue to monitor    Plan:  - VS per unit protocol  - SCDs for DVT ppx  - Regular diet  - Pain well controlled, continue current pain regimen  - Encourage ambulation  - Continue wound care  - Continue BP monitoring  - Discharge instructions reviewed  - D/c planning initiated, patient to follow up in office in 1 week for BP check and 6 weeks for post partum visit    Casey Garcia MD

## 2024-03-16 RX ADMIN — HEPARIN SODIUM 10000 UNIT(S): 5000 INJECTION INTRAVENOUS; SUBCUTANEOUS at 18:30

## 2024-03-16 RX ADMIN — Medication 600 MILLIGRAM(S): at 17:22

## 2024-03-16 RX ADMIN — Medication 600 MILLIGRAM(S): at 05:30

## 2024-03-16 RX ADMIN — SENNA PLUS 2 TABLET(S): 8.6 TABLET ORAL at 23:35

## 2024-03-16 RX ADMIN — Medication 600 MILLIGRAM(S): at 16:52

## 2024-03-16 RX ADMIN — Medication 600 MILLIGRAM(S): at 23:35

## 2024-03-16 RX ADMIN — Medication 325 MILLIGRAM(S): at 18:29

## 2024-03-16 RX ADMIN — Medication 600 MILLIGRAM(S): at 05:59

## 2024-03-16 RX ADMIN — HEPARIN SODIUM 10000 UNIT(S): 5000 INJECTION INTRAVENOUS; SUBCUTANEOUS at 05:31

## 2024-03-16 RX ADMIN — Medication 1 TABLET(S): at 18:29

## 2024-03-17 VITALS
OXYGEN SATURATION: 100 % | SYSTOLIC BLOOD PRESSURE: 129 MMHG | TEMPERATURE: 98 F | HEART RATE: 102 BPM | RESPIRATION RATE: 18 BRPM | DIASTOLIC BLOOD PRESSURE: 67 MMHG

## 2024-03-17 PROCEDURE — 93970 EXTREMITY STUDY: CPT | Mod: 26

## 2024-03-17 RX ADMIN — Medication 1 TABLET(S): at 12:01

## 2024-03-17 RX ADMIN — SIMETHICONE 80 MILLIGRAM(S): 80 TABLET, CHEWABLE ORAL at 05:45

## 2024-03-17 RX ADMIN — Medication 600 MILLIGRAM(S): at 18:51

## 2024-03-17 RX ADMIN — Medication 600 MILLIGRAM(S): at 19:21

## 2024-03-17 RX ADMIN — Medication 600 MILLIGRAM(S): at 12:31

## 2024-03-17 RX ADMIN — Medication 600 MILLIGRAM(S): at 12:01

## 2024-03-17 RX ADMIN — Medication 325 MILLIGRAM(S): at 12:01

## 2024-03-17 RX ADMIN — Medication 600 MILLIGRAM(S): at 06:36

## 2024-03-17 RX ADMIN — Medication 975 MILLIGRAM(S): at 02:23

## 2024-03-17 RX ADMIN — Medication 600 MILLIGRAM(S): at 05:45

## 2024-03-17 RX ADMIN — HEPARIN SODIUM 10000 UNIT(S): 5000 INJECTION INTRAVENOUS; SUBCUTANEOUS at 06:21

## 2024-03-17 RX ADMIN — Medication 975 MILLIGRAM(S): at 03:20

## 2024-03-17 RX ADMIN — Medication 600 MILLIGRAM(S): at 00:22

## 2024-03-17 NOTE — CHART NOTE - NSCHARTNOTEFT_GEN_A_CORE
At the end of  delivery, xray called for as no instrument count was performed due to urgency of case  xray tech in room, xray board placed under the patient   to limit staff radiation exposure, as per protocol, team stepped away from the OR table for performance of xray  pt subsequently slipped off of the OR table unwitnessed, into a seated position. Pt did not lose consciousness or strike her head  Immediate nursing assistance was called for and a Code Fall was activated  Pt did not report any pain at this time  Pt was lowered from seated position into supine position  Code Fall team arrived at bedside to perform evaluation   Pt's partner updated on above    Leadership escalation, Dr. Hare made aware of the above situation  maximino KAT
Postpartum Note,  Section  She is a  28y woman who is now post-operative day 4. Pt complained of left lower extremity swelling> right leg. She states there is no pain when she is still but hurts when she starts to walk.    Subjective:  The patient feels well.  She is ambulating.   She is tolerating regular diet.  She denies nausea and vomiting.  She is voiding.  Her pain is controlled.  She reports normal postpartum bleeding.  She is breastfeeding.      Physical exam:    Vital Signs Last 24 Hrs  T(C): 36.7 (17 Mar 2024 13:54), Max: 36.7 (16 Mar 2024 17:18)  T(F): 98.1 (17 Mar 2024 13:54), Max: 98.1 (17 Mar 2024 02:35)  HR: 95 (17 Mar 2024 13:54) (81 - 100)  BP: 137/78 (17 Mar 2024 13:54) (115/56 - 137/78)  BP(mean): --  RR: 18 (17 Mar 2024 13:54) (18 - 18)  SpO2: 99% (17 Mar 2024 13:54) (99% - 100%)    Parameters below as of 17 Mar 2024 02:35  Patient On (Oxygen Delivery Method): room air        Gen: NAD  Abdomen: Soft, nontender, no distension , firm uterine fundus at umbilicus.  Incision: Clean, dry, and intact with dermabond  Ext: No calf tenderness of either side, no ecchymosis from fall. 2 + pitting edema bilaterally    Allergy Status Unknown        MEDICATIONS  (STANDING):  acetaminophen     Tablet .. 975 milliGRAM(s) Oral <User Schedule>  diphtheria/tetanus/pertussis (acellular) Vaccine (Adacel) 0.5 milliLiter(s) IntraMuscular once  ferrous    sulfate 325 milliGRAM(s) Oral daily  heparin   Injectable 79831 Unit(s) SubCutaneous every 12 hours  ibuprofen  Tablet. 600 milliGRAM(s) Oral every 6 hours  influenza   Vaccine 0.5 milliLiter(s) IntraMuscular once  lactated ringers. 1000 milliLiter(s) (125 mL/Hr) IV Continuous <Continuous>  oxytocin Infusion 333.333 milliUNIT(s)/Min (1000 mL/Hr) IV Continuous <Continuous>  prenatal multivitamin 1 Tablet(s) Oral daily  senna 2 Tablet(s) Oral at bedtime    MEDICATIONS  (PRN):  diphenhydrAMINE 25 milliGRAM(s) Oral every 6 hours PRN Pruritus  lanolin Ointment 1 Application(s) Topical every 6 hours PRN Sore Nipples  magnesium hydroxide Suspension 30 milliLiter(s) Oral two times a day PRN Constipation  oxyCODONE    IR 5 milliGRAM(s) Oral every 3 hours PRN Moderate to Severe Pain (4-10)  oxyCODONE    IR 5 milliGRAM(s) Oral once PRN Moderate to Severe Pain (4-10)  simethicone 80 milliGRAM(s) Chew every 4 hours PRN Gas        Assessment and Plan  POD # 4  s/p  section and slip off OR table onto floor.   Doing well except new onset left leg edema> right side. LE dopplers ordered this am and are not completed as yet at 3 pm. Pt desires discharge home today. Will try to expedite lower extremity doppler study.   Continue routine post op care.    Ching Jacobs MD
Postpartum Note,  Section  She is a  28y woman who is now post-operative day: 3    Subjective:  The patient feels well.  She is ambulating.   She is tolerating regular diet.  She denies nausea and vomiting.  She is voiding.  Her pain is controlled.  She reports normal postpartum bleeding.  She is breastfeeding.      Physical exam:    Vital Signs Last 24 Hrs  T(C): 36.6 (16 Mar 2024 13:29), Max: 36.9 (15 Mar 2024 22:25)  T(F): 97.9 (16 Mar 2024 13:29), Max: 98.5 (15 Mar 2024 22:25)  HR: 98 (16 Mar 2024 13:29) (97 - 110)  BP: 124/75 (16 Mar 2024 13:29) (120/70 - 131/69)  BP(mean): --  RR: 18 (16 Mar 2024 13:29) (18 - 18)  SpO2: 100% (16 Mar 2024 13:29) (100% - 100%)    Parameters below as of 16 Mar 2024 05:39  Patient On (Oxygen Delivery Method): room air        Gen: NAD  Breast: Soft, nontender, not engorged.  Abdomen: Soft, nontender, no distension , firm uterine fundus at umbilicus.  Incision: Clean, dry, and intact with steri strips  Ext: No calf tenderness    Allergies    Allergy Status Unknown          MEDICATIONS  (STANDING):  acetaminophen     Tablet .. 975 milliGRAM(s) Oral <User Schedule>  diphtheria/tetanus/pertussis (acellular) Vaccine (Adacel) 0.5 milliLiter(s) IntraMuscular once  ferrous    sulfate 325 milliGRAM(s) Oral daily  heparin   Injectable 59246 Unit(s) SubCutaneous every 12 hours  ibuprofen  Tablet. 600 milliGRAM(s) Oral every 6 hours  influenza   Vaccine 0.5 milliLiter(s) IntraMuscular once  lactated ringers. 1000 milliLiter(s) (125 mL/Hr) IV Continuous <Continuous>  oxytocin Infusion 333.333 milliUNIT(s)/Min (1000 mL/Hr) IV Continuous <Continuous>  prenatal multivitamin 1 Tablet(s) Oral daily  senna 2 Tablet(s) Oral at bedtime    MEDICATIONS  (PRN):  diphenhydrAMINE 25 milliGRAM(s) Oral every 6 hours PRN Pruritus  lanolin Ointment 1 Application(s) Topical every 6 hours PRN Sore Nipples  magnesium hydroxide Suspension 30 milliLiter(s) Oral two times a day PRN Constipation  oxyCODONE    IR 5 milliGRAM(s) Oral every 3 hours PRN Moderate to Severe Pain (4-10)  oxyCODONE    IR 5 milliGRAM(s) Oral once PRN Moderate to Severe Pain (4-10)  simethicone 80 milliGRAM(s) Chew every 4 hours PRN Gas        Assessment and Plan  POD #3   s/p  section  s/p fall off OR table, no sequelae.  Doing well.  Encourage ambulation.  Continue routine post op care.  Pt to stay until pod 4 secondary to elevated  bilirubin.    Ching Jacobs MD
ATT:  Pt had bilateral lower extremity dopplers- negative. Will discharge home. Followup in 2 weeks.
Backchart due to clinical responsibility     Called to bedside for prolonged deceleration after AROM at 530pm  P0 IOL for oligo, pt sent from the ATU due to prolonged decel during monitoring  Since arrival, pt had 2 prior decels, received terb x1  At bedside, FHR in the 60s for 4 minutes, pt in left lateral, right lateral, all 4's  Decision made to move to OR, OR opened, nursing and anesthesia made aware  Pt and partner in agreement with plan     Upon arrival to OR, FHR noted in the 140s  Decision made to hold off on  delivery at that time   Pt to receive epidural and continue monitoring in the OR    After epidural placed, pt noted to have intermittent late decels for the following 30 minutes, contractions Q8min  Prolonged deceleration again noted  Decision made to proceed with primary  delivery  Consent obtained  Emergent primary delivery performed     maximion KAT
Upon review of chart, patient found to meet criteria for gestational hypertension. Diagnosis was discussed with patient. Patient denies any severe symptoms(HA, CP, SOB, N/V, epigastric pain) at this time. Will continue to monitor BP. All questions answered. Counseled on importance of bp monitoring after discharge. BP cuff sent to [facility]    No HELLP labs at this time. Plan per Dr. Zachary Rodríguez MD, PGY-1

## 2024-03-26 ENCOUNTER — APPOINTMENT (OUTPATIENT)
Dept: OBGYN | Facility: CLINIC | Age: 29
End: 2024-03-26
Payer: MEDICAID

## 2024-03-26 VITALS
BODY MASS INDEX: 38.73 KG/M2 | HEART RATE: 94 BPM | WEIGHT: 241 LBS | HEIGHT: 66 IN | SYSTOLIC BLOOD PRESSURE: 126 MMHG | DIASTOLIC BLOOD PRESSURE: 81 MMHG

## 2024-03-26 PROCEDURE — 0503F POSTPARTUM CARE VISIT: CPT

## 2024-03-27 NOTE — PLAN
[FreeTextEntry1] : SHLOMO  is a 28 year old female presenting for a follow up exam.  Keloid visible on right side of incision.  RTO for PPA visit.

## 2024-03-27 NOTE — DISCUSSION/SUMMARY
[FreeTextEntry1] : This note was written by Kamille Farnsworth on 03/26/2024  actively solely PHYLLIS Polk M.D.   All medical record entries made by this scribe at my, PHYLLIS Whiteside M.D direction and personally dictated by me on 03/26/2024 . I have personally reviewed the chart and agree that the record reflects my personal performance of the history, physical exam, assessment, and plan.

## 2024-03-27 NOTE — HISTORY OF PRESENT ILLNESS
[FreeTextEntry1] : 28 year  old pt presents for a follow up appointment regarding HTN after giving birth 3 weeks ago. Pt had CS and reports pain on the right side of her incision. Pt has been monitoring at home BP. She reports no BP readings above 140/90.

## 2024-04-24 ENCOUNTER — APPOINTMENT (OUTPATIENT)
Dept: OBGYN | Facility: CLINIC | Age: 29
End: 2024-04-24
Payer: MEDICAID

## 2024-04-24 VITALS
HEART RATE: 80 BPM | HEIGHT: 66 IN | SYSTOLIC BLOOD PRESSURE: 109 MMHG | BODY MASS INDEX: 38.57 KG/M2 | DIASTOLIC BLOOD PRESSURE: 77 MMHG | WEIGHT: 240 LBS

## 2024-04-24 PROCEDURE — 0503F POSTPARTUM CARE VISIT: CPT

## 2024-04-24 PROCEDURE — G0444 DEPRESSION SCREEN ANNUAL: CPT

## 2024-04-24 NOTE — HISTORY OF PRESENT ILLNESS
[Delivery Date: ___] : on [unfilled] [Primary C/S] : delivered by  section [ Section] :  section [Pertussis Vaccine] : Pertussis vaccine administered [Boy] : baby is a boy [Infant's Name ___] : [unfilled] [___ Lbs] : [unfilled] lbs [___ Oz] : [unfilled] oz [Not Circumcised] : not circumcised [Living at Home] : is currently living at home [Breastfeeding] : currently nursing [Intended Contraception] : Intended Contraception: [IUD] : intrauterine device [Postpartum Follow Up] : postpartum follow up [S/Sx PP Depression] : signs/symptoms of postpartum depression [[place] Depression Scale ___ (0-30)] : [unfilled] [Clean/Dry/Intact] : clean, dry and intact [Healed] : healed [Examination Of The Breasts] : breasts are normal [Normal] : cervix [Doing Well] : is doing well [No Sign of Infection] : is showing no signs of infection [None] : None [Rhogam] : Rhogam was not administered [Rubella Vaccine] : Rubella vaccine was not administered [BF with Difficulty] : nursing without difficulty [Resumed Menses] : has not resumed her menses [Resumed [place]] : has not resumed intercourse [de-identified] : Sutures dissolved.  patient screened for depression - no signs of clinical depression. EPDS scores reviewed over the course of the visit. 5-10 minutes of face to face time. Follow up with changes in mood including other symptoms of anxiety. [de-identified] : Pt will call with contraception choice. [FreeTextEntry1] : 28 year old P1 presents for a PPA visit. Pt feels well.

## 2024-04-24 NOTE — PLAN
[TextEntry] : - Discussed seeking therapy if PPA depression gets worse. - Pt will call with desired contraception.

## 2024-05-07 ENCOUNTER — APPOINTMENT (OUTPATIENT)
Dept: MRI IMAGING | Facility: HOSPITAL | Age: 29
End: 2024-05-07

## 2024-05-07 ENCOUNTER — OUTPATIENT (OUTPATIENT)
Dept: OUTPATIENT SERVICES | Facility: HOSPITAL | Age: 29
LOS: 1 days | End: 2024-05-07
Payer: SUBSIDIZED

## 2024-05-07 DIAGNOSIS — Z00.6 ENCOUNTER FOR EXAMINATION FOR NORMAL COMPARISON AND CONTROL IN CLINICAL RESEARCH PROGRAM: ICD-10-CM

## 2024-05-07 DIAGNOSIS — Z00.00 ENCOUNTER FOR GENERAL ADULT MEDICAL EXAMINATION WITHOUT ABNORMAL FINDINGS: ICD-10-CM

## 2024-05-07 DIAGNOSIS — Z98.890 OTHER SPECIFIED POSTPROCEDURAL STATES: Chronic | ICD-10-CM

## 2024-05-07 PROCEDURE — 70551 MRI BRAIN STEM W/O DYE: CPT

## 2024-05-07 PROCEDURE — 70551 MRI BRAIN STEM W/O DYE: CPT | Mod: 26

## 2024-05-08 ENCOUNTER — NON-APPOINTMENT (OUTPATIENT)
Age: 29
End: 2024-05-08

## 2024-08-20 NOTE — PACU DISCHARGE NOTE - HYDRATION STATUS:
Health Maintenance       COVID-19 Vaccine (3 - 2023-24 season)  Overdue since 9/1/2023           Following review of the above:  Patient is not proceeding with: COVID-19    Note: Refer to final orders and clinician documentation.      
Satisfactory
DM (diabetes mellitus)

## (undated) DEVICE — DRSG DERMABOND PRINEO 22CM